# Patient Record
Sex: MALE | Race: BLACK OR AFRICAN AMERICAN | Employment: FULL TIME | URBAN - METROPOLITAN AREA
[De-identification: names, ages, dates, MRNs, and addresses within clinical notes are randomized per-mention and may not be internally consistent; named-entity substitution may affect disease eponyms.]

---

## 2019-02-04 ENCOUNTER — HOSPITAL ENCOUNTER (EMERGENCY)
Facility: CLINIC | Age: 44
Discharge: HOME | End: 2019-02-04
Attending: PHYSICIAN ASSISTANT | Admitting: PHYSICIAN ASSISTANT
Payer: COMMERCIAL

## 2019-02-04 VITALS
HEART RATE: 87 BPM | OXYGEN SATURATION: 98 % | TEMPERATURE: 97.3 F | DIASTOLIC BLOOD PRESSURE: 106 MMHG | RESPIRATION RATE: 16 BRPM | SYSTOLIC BLOOD PRESSURE: 146 MMHG

## 2019-02-04 DIAGNOSIS — M79.602 PAIN OF LEFT UPPER EXTREMITY: ICD-10-CM

## 2019-02-04 DIAGNOSIS — S09.90XA HEAD INJURY, INITIAL ENCOUNTER: ICD-10-CM
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
## 2019-02-04 PROCEDURE — 99282 EMERGENCY DEPT VISIT SF MDM: CPT

## 2019-02-04 RX ORDER — METHOCARBAMOL 500 MG/1
500 TABLET, FILM COATED ORAL 4 TIMES DAILY PRN
Qty: 20 TABLET | Refills: 0 | Status: SHIPPED | OUTPATIENT
Start: 2019-02-04 | End: 2019-06-04

## 2019-02-04 ASSESSMENT — ENCOUNTER SYMPTOMS
NAUSEA: 0
NUMBNESS: 0
VOMITING: 0
ABDOMINAL PAIN: 0
MYALGIAS: 1
BACK PAIN: 0

## 2019-02-04 NOTE — ED AVS SNAPSHOT
[facility] Emergency Department  [address]  [facility] [address]  Phone:  [phone]  Fax:  [phone]                                    German Cole   MRN: [id number]    Department:  [facility] Emergency Department   Date of Visit:  2/4/2019           After Visit Summary Signature Page    I have received my discharge instructions, and my questions have been answered. I have discussed any challenges I see with this plan with the nurse or doctor.    ..........................................................................................................................................  Patient/Patient Representative Signature      ..........................................................................................................................................  Patient Representative Print Name and Relationship to Patient    ..................................................               ................................................  Date                                   Time    ..........................................................................................................................................  Reviewed by Signature/Title    ...................................................              ..............................................  Date                                               Time          22EPIC Rev 08/18
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
## 2019-02-25 ENCOUNTER — OFFICE VISIT (OUTPATIENT)
Dept: FAMILY MEDICINE | Facility: CLINIC | Age: 44
End: 2019-02-25
Payer: COMMERCIAL

## 2019-02-25 VITALS
TEMPERATURE: 98.5 F | DIASTOLIC BLOOD PRESSURE: 90 MMHG | HEART RATE: 92 BPM | SYSTOLIC BLOOD PRESSURE: 138 MMHG | RESPIRATION RATE: 20 BRPM

## 2019-02-25 DIAGNOSIS — R03.0 SINGLE EPISODE OF ELEVATED BLOOD PRESSURE: ICD-10-CM

## 2019-02-25 DIAGNOSIS — S43.302A SUBLUXATION OF LEFT SHOULDER GIRDLE, INITIAL ENCOUNTER: Primary | ICD-10-CM

## 2019-02-25 PROCEDURE — 99203 OFFICE O/P NEW LOW 30 MIN: CPT | Performed by: FAMILY MEDICINE
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
## 2019-02-25 NOTE — PROGRESS NOTES
BP elevated.  BP Readings from Last 1 Encounters:   02/25/19 138/90       Recommend recheckig over the next month at [facility] by nurse or pharmacy  Mateo Barajas

## 2019-03-04 ENCOUNTER — ANCILLARY PROCEDURE (OUTPATIENT)
Dept: GENERAL RADIOLOGY | Facility: CLINIC | Age: 44
End: 2019-03-04
Attending: FAMILY MEDICINE
Payer: COMMERCIAL

## 2019-03-04 ENCOUNTER — OFFICE VISIT (OUTPATIENT)
Dept: ORTHOPEDICS | Facility: CLINIC | Age: 44
End: 2019-03-04
Payer: COMMERCIAL

## 2019-03-04 VITALS
HEIGHT: 72 IN | BODY MASS INDEX: 40.9 KG/M2 | SYSTOLIC BLOOD PRESSURE: 141 MMHG | DIASTOLIC BLOOD PRESSURE: 85 MMHG | WEIGHT: 302 LBS

## 2019-03-04 DIAGNOSIS — M75.42 CORACOID IMPINGEMENT OF LEFT SHOULDER: Primary | ICD-10-CM

## 2019-03-04 DIAGNOSIS — M25.512 ACUTE PAIN OF LEFT SHOULDER: ICD-10-CM

## 2019-03-04 DIAGNOSIS — M75.92 LEFT SUPRASPINATUS TENDINITIS: ICD-10-CM

## 2019-03-04 PROCEDURE — 73030 X-RAY EXAM OF SHOULDER: CPT | Mod: LT

## 2019-03-04 PROCEDURE — 99204 OFFICE O/P NEW MOD 45 MIN: CPT | Performed by: FAMILY MEDICINE

## 2019-03-04 RX ORDER — NAPROXEN 500 MG/1
500 TABLET ORAL 2 TIMES DAILY WITH MEALS
Qty: 60 TABLET | Refills: 1 | Status: SHIPPED | OUTPATIENT
Start: 2019-03-04 | End: 2019-04-24

## 2019-03-04 RX ORDER — CYCLOBENZAPRINE HCL 10 MG
10 TABLET ORAL
Qty: 30 TABLET | Refills: 0 | Status: SHIPPED | OUTPATIENT
Start: 2019-03-04 | End: 2019-04-08

## 2019-03-04 ASSESSMENT — MIFFLIN-ST. JEOR: SCORE: 2307.86
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
## 2019-03-12 ENCOUNTER — THERAPY VISIT (OUTPATIENT)
Dept: PHYSICAL THERAPY | Facility: CLINIC | Age: 44
End: 2019-03-12
Payer: COMMERCIAL

## 2019-03-12 DIAGNOSIS — M25.512 SHOULDER PAIN, LEFT: ICD-10-CM

## 2019-03-12 DIAGNOSIS — M75.42 CORACOID IMPINGEMENT OF LEFT SHOULDER: ICD-10-CM

## 2019-03-12 DIAGNOSIS — M75.92 LEFT SUPRASPINATUS TENDINITIS: ICD-10-CM

## 2019-03-12 PROCEDURE — 97010 HOT OR COLD PACKS THERAPY: CPT | Mod: GP | Performed by: PHYSICAL THERAPIST

## 2019-03-12 PROCEDURE — 97110 THERAPEUTIC EXERCISES: CPT | Mod: GP | Performed by: PHYSICAL THERAPIST

## 2019-03-12 PROCEDURE — 97161 PT EVAL LOW COMPLEX 20 MIN: CPT | Mod: GP | Performed by: PHYSICAL THERAPIST

## 2019-03-12 NOTE — PROGRESS NOTES
[facility] Initial Evaluation  Subjective:                                         Medical allergies: none.  Surgical history: 2016 low back.  Current medications:  Pain medication and muscle relaxants.  Current occupation is .    Primary job tasks include:  Driving.                                Objective:  System    Physical Exam    General     ROS    Assessment/Plan:
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
## 2019-03-13 PROBLEM — M25.512 SHOULDER PAIN, LEFT: Status: ACTIVE | Noted: 2019-03-13

## 2019-03-18 ENCOUNTER — THERAPY VISIT (OUTPATIENT)
Dept: PHYSICAL THERAPY | Facility: CLINIC | Age: 44
End: 2019-03-18
Payer: COMMERCIAL

## 2019-03-18 DIAGNOSIS — M25.512 SHOULDER PAIN, LEFT: ICD-10-CM

## 2019-03-18 PROCEDURE — 97110 THERAPEUTIC EXERCISES: CPT | Mod: GP

## 2019-04-08 DIAGNOSIS — M75.42 CORACOID IMPINGEMENT OF LEFT SHOULDER: ICD-10-CM

## 2019-04-08 DIAGNOSIS — M75.92 LEFT SUPRASPINATUS TENDINITIS: ICD-10-CM

## 2019-04-09 RX ORDER — CYCLOBENZAPRINE HCL 10 MG
TABLET ORAL
Qty: 30 TABLET | Refills: 0 | Status: SHIPPED | OUTPATIENT
Start: 2019-04-09 | End: 2019-06-04

## 2019-04-24 DIAGNOSIS — M75.92 LEFT SUPRASPINATUS TENDINITIS: ICD-10-CM

## 2019-04-24 DIAGNOSIS — M75.42 CORACOID IMPINGEMENT OF LEFT SHOULDER: ICD-10-CM

## 2019-04-26 RX ORDER — NAPROXEN 500 MG/1
TABLET ORAL
Qty: 60 TABLET | Refills: 0 | Status: SHIPPED | OUTPATIENT
Start: 2019-04-26 | End: 2019-06-04

## 2019-04-26 NOTE — PROGRESS NOTES
Subjective:  HPI                    Objective:  System    Physical Exam    General     ROS    Assessment/Plan:    DISCHARGE REPORT    Progress reporting period is from 3/12/2019 to 3/18/2019.       SUBJECTIVE  Subjective changes noted by patient: As of German's last visit he stated his shoulder was feeling less painful and stronger. He stated he has popping in his shoulder at at times and pain wtih driving if he has alot of resistance with his steering wheel.      Changes in function:  Yes (See Goal flowsheet attached for changes in current functional level)  Adverse reaction to treatment or activity: None    OBJECTIVE  Changes noted in objective findings:  Yes,   Objective: AROM flex- 170, abd- 170.      ASSESSMENT/PLAN  Updated problem list and treatment plan: Diagnosis 1:  Left shoulder pain  Pain -  hot/cold therapy  Decreased ROM/flexibility - manual therapy and therapeutic exercise  Decreased strength - therapeutic exercise and therapeutic activities  STG/LTGs have been met or progress has been made towards goals:  Yes (See Goal flow sheet completed today.)  Assessment of Progress: The patient's condition is improving.  Self Management Plans:  Patient has been instructed in a home treatment program.    German continues to require the following intervention to meet STG and LTG's:  PT intervention is no longer required to meet STG/LTG. German did not return for further PT sessions.      Recommendations:  This patient is ready to be discharged from therapy and continue their home treatment program.    Please refer to the daily flowsheet for treatment today, total treatment time and time spent performing 1:1 timed codes.

## 2019-05-02 PROBLEM — M25.512 SHOULDER PAIN, LEFT: Status: RESOLVED | Noted: 2019-03-13 | Resolved: 2019-05-02

## 2019-05-23 DIAGNOSIS — M75.92 LEFT SUPRASPINATUS TENDINITIS: ICD-10-CM

## 2019-05-23 DIAGNOSIS — M75.42 CORACOID IMPINGEMENT OF LEFT SHOULDER: ICD-10-CM

## 2019-05-24 NOTE — TELEPHONE ENCOUNTER
Medication Request for: Naproxen 500mg and Cyclobenzaprine 10mg          Patient currently taking:   Patient has ____ of medication remaining.  Patient is also taking OTC:     Problems/ Concerns of Patient: no side effects reported  Prescription for Naproxen 500mg last written on 4/26/19 by Dr. Yeo  Sig: take one tab twice daily   Last Fill Quantity: #60  refills: 0    Prescription for cyclobenzaprine 10mg  Last written on 4/9/19 by Dr. Yeo  Sig: take one tab nightly as needed    Last Office Visit by provider: 3/4/19    Patient desires to have faxed or E-prescribe to pharmacy if available  Pharmacy selected in [place].    Phone number patient can be reached at: Home number on file [phone] (home)    Can we leave a detailed message on this number? NO consent on file    Medication requests may take up to 3 business days for a response  Has patient been notified of this Unknown    Plan from last office visit was to return in 4 weeks.   Left voicemail asking patient to return call.     SOLE Rodas RN

## 2019-06-03 NOTE — TELEPHONE ENCOUNTER
Received [facility]nd [facility].     Left 2nd message for patient to return call.     Phone call to [facility] and leave message informing them that patient needs to be seen.     SOLE Rodas RN

## 2019-06-04 ENCOUNTER — HOSPITAL ENCOUNTER (OUTPATIENT)
Facility: CLINIC | Age: 44
Setting detail: OBSERVATION
Discharge: HOME | End: 2019-06-07
Attending: EMERGENCY MEDICINE | Admitting: HOSPITALIST
Payer: COMMERCIAL

## 2019-06-04 ENCOUNTER — TRANSFERRED RECORDS (OUTPATIENT)
Dept: HEALTH INFORMATION MANAGEMENT | Facility: CLINIC | Age: 44
End: 2019-06-04

## 2019-06-04 ENCOUNTER — APPOINTMENT (OUTPATIENT)
Dept: GENERAL RADIOLOGY | Facility: CLINIC | Age: 44
End: 2019-06-04
Attending: EMERGENCY MEDICINE
Payer: COMMERCIAL

## 2019-06-04 DIAGNOSIS — R07.9 CHEST PAIN ON EXERTION: ICD-10-CM

## 2019-06-04 PROBLEM — M79.89 PAIN AND SWELLING OF LOWER LEG: Status: ACTIVE | Noted: 2019-06-04

## 2019-06-04 PROBLEM — M79.669 PAIN AND SWELLING OF LOWER LEG: Status: ACTIVE | Noted: 2019-06-04

## 2019-06-04 LAB
ALBUMIN SERPL-MCNC: 4.4 G/DL (ref 3.4–5)
ALP SERPL-CCNC: 41 U/L (ref 40–150)
ALT SERPL W P-5'-P-CCNC: 48 U/L (ref 0–70)
ANION GAP SERPL CALCULATED.3IONS-SCNC: 7 MMOL/L (ref 3–14)
AST SERPL W P-5'-P-CCNC: 44 U/L (ref 0–45)
BASOPHILS # BLD AUTO: 0 10E9/L (ref 0–0.2)
BASOPHILS NFR BLD AUTO: 0.5 %
BILIRUB DIRECT SERPL-MCNC: 0.2 MG/DL (ref 0–0.2)
BILIRUB SERPL-MCNC: 1 MG/DL (ref 0.2–1.3)
BUN SERPL-MCNC: 11 MG/DL (ref 7–30)
CALCIUM SERPL-MCNC: 8.8 MG/DL (ref 8.5–10.1)
CHLORIDE SERPL-SCNC: 106 MMOL/L (ref 94–109)
CHOLEST SERPL-MCNC: 169 MG/DL
CO2 SERPL-SCNC: 25 MMOL/L (ref 20–32)
CREAT SERPL-MCNC: 0.94 MG/DL (ref 0.66–1.25)
DIFFERENTIAL METHOD BLD: NORMAL
EOSINOPHIL # BLD AUTO: 0.4 10E9/L (ref 0–0.7)
EOSINOPHIL NFR BLD AUTO: 4.5 %
ERYTHROCYTE [DISTWIDTH] IN BLOOD BY AUTOMATED COUNT: 13.2 % (ref 10–15)
GFR SERPL CREATININE-BSD FRML MDRD: >90 ML/MIN/{1.73_M2}
GLUCOSE SERPL-MCNC: 98 MG/DL (ref 70–99)
HBA1C MFR BLD: 6.7 % (ref 0–5.6)
HCT VFR BLD AUTO: 43.5 % (ref 40–53)
HDLC SERPL-MCNC: 35 MG/DL
HGB BLD-MCNC: 15 G/DL (ref 13.3–17.7)
IMM GRANULOCYTES # BLD: 0 10E9/L (ref 0–0.4)
IMM GRANULOCYTES NFR BLD: 0.1 %
LDLC SERPL CALC-MCNC: 114 MG/DL
LYMPHOCYTES # BLD AUTO: 4 10E9/L (ref 0.8–5.3)
LYMPHOCYTES NFR BLD AUTO: 51.5 %
MCH RBC QN AUTO: 31.8 PG (ref 26.5–33)
MCHC RBC AUTO-ENTMCNC: 34.5 G/DL (ref 31.5–36.5)
MCV RBC AUTO: 92 FL (ref 78–100)
MONOCYTES # BLD AUTO: 0.7 10E9/L (ref 0–1.3)
MONOCYTES NFR BLD AUTO: 8.8 %
NEUTROPHILS # BLD AUTO: 2.7 10E9/L (ref 1.6–8.3)
NEUTROPHILS NFR BLD AUTO: 34.6 %
NONHDLC SERPL-MCNC: 134 MG/DL
NRBC # BLD AUTO: 0 10*3/UL
NRBC BLD AUTO-RTO: 0 /100
NT-PROBNP SERPL-MCNC: 54 PG/ML (ref 0–450)
PLATELET # BLD AUTO: 272 10E9/L (ref 150–450)
POTASSIUM SERPL-SCNC: 3.8 MMOL/L (ref 3.4–5.3)
PROT SERPL-MCNC: 8.1 G/DL (ref 6.8–8.8)
RBC # BLD AUTO: 4.72 10E12/L (ref 4.4–5.9)
SODIUM SERPL-SCNC: 138 MMOL/L (ref 133–144)
TRIGL SERPL-MCNC: 100 MG/DL
TROPONIN I SERPL-MCNC: <0.015 UG/L (ref 0–0.04)
WBC # BLD AUTO: 7.7 10E9/L (ref 4–11)

## 2019-06-04 PROCEDURE — 84484 ASSAY OF TROPONIN QUANT: CPT | Performed by: EMERGENCY MEDICINE

## 2019-06-04 PROCEDURE — 99285 EMERGENCY DEPT VISIT HI MDM: CPT | Mod: 25

## 2019-06-04 PROCEDURE — 83036 HEMOGLOBIN GLYCOSYLATED A1C: CPT | Performed by: EMERGENCY MEDICINE

## 2019-06-04 PROCEDURE — 85025 COMPLETE CBC W/AUTO DIFF WBC: CPT | Performed by: EMERGENCY MEDICINE

## 2019-06-04 PROCEDURE — 93005 ELECTROCARDIOGRAM TRACING: CPT

## 2019-06-04 PROCEDURE — 25000128 H RX IP 250 OP 636: Performed by: EMERGENCY MEDICINE

## 2019-06-04 PROCEDURE — 80076 HEPATIC FUNCTION PANEL: CPT | Performed by: PHYSICIAN ASSISTANT

## 2019-06-04 PROCEDURE — 80061 LIPID PANEL: CPT | Performed by: PHYSICIAN ASSISTANT

## 2019-06-04 PROCEDURE — 71046 X-RAY EXAM CHEST 2 VIEWS: CPT

## 2019-06-04 PROCEDURE — G0378 HOSPITAL OBSERVATION PER HR: HCPCS

## 2019-06-04 PROCEDURE — 96374 THER/PROPH/DIAG INJ IV PUSH: CPT

## 2019-06-04 PROCEDURE — 83880 ASSAY OF NATRIURETIC PEPTIDE: CPT | Performed by: EMERGENCY MEDICINE

## 2019-06-04 PROCEDURE — 80048 BASIC METABOLIC PNL TOTAL CA: CPT | Performed by: EMERGENCY MEDICINE

## 2019-06-04 PROCEDURE — 99220 ZZC INITIAL OBSERVATION CARE,LEVL III: CPT | Performed by: PHYSICIAN ASSISTANT

## 2019-06-04 RX ORDER — ACETAMINOPHEN 500 MG
500-1000 TABLET ORAL 2 TIMES DAILY PRN
COMMUNITY

## 2019-06-04 RX ORDER — ACETAMINOPHEN 500 MG
500-1000 TABLET ORAL 2 TIMES DAILY PRN
Status: DISCONTINUED | OUTPATIENT
Start: 2019-06-04 | End: 2019-06-07 | Stop reason: HOSPADM

## 2019-06-04 RX ORDER — NAPROXEN 500 MG/1
500 TABLET ORAL 2 TIMES DAILY WITH MEALS
COMMUNITY

## 2019-06-04 RX ORDER — ONDANSETRON 4 MG/1
4 TABLET, ORALLY DISINTEGRATING ORAL EVERY 6 HOURS PRN
Status: DISCONTINUED | OUTPATIENT
Start: 2019-06-04 | End: 2019-06-07 | Stop reason: HOSPADM

## 2019-06-04 RX ORDER — FUROSEMIDE 10 MG/ML
20 INJECTION INTRAMUSCULAR; INTRAVENOUS ONCE
Status: COMPLETED | OUTPATIENT
Start: 2019-06-04 | End: 2019-06-04

## 2019-06-04 RX ORDER — FUROSEMIDE 10 MG/ML
20 INJECTION INTRAMUSCULAR; INTRAVENOUS ONCE
Status: COMPLETED | OUTPATIENT
Start: 2019-06-05 | End: 2019-06-05

## 2019-06-04 RX ORDER — AMOXICILLIN 250 MG
2 CAPSULE ORAL 2 TIMES DAILY PRN
Status: DISCONTINUED | OUTPATIENT
Start: 2019-06-04 | End: 2019-06-07 | Stop reason: HOSPADM

## 2019-06-04 RX ORDER — CYCLOBENZAPRINE HCL 10 MG
10 TABLET ORAL
Status: DISCONTINUED | OUTPATIENT
Start: 2019-06-04 | End: 2019-06-07 | Stop reason: HOSPADM

## 2019-06-04 RX ORDER — AMOXICILLIN 250 MG
1 CAPSULE ORAL 2 TIMES DAILY PRN
Status: DISCONTINUED | OUTPATIENT
Start: 2019-06-04 | End: 2019-06-07 | Stop reason: HOSPADM

## 2019-06-04 RX ORDER — NALOXONE HYDROCHLORIDE 0.4 MG/ML
.1-.4 INJECTION, SOLUTION INTRAMUSCULAR; INTRAVENOUS; SUBCUTANEOUS
Status: DISCONTINUED | OUTPATIENT
Start: 2019-06-04 | End: 2019-06-07 | Stop reason: HOSPADM

## 2019-06-04 RX ORDER — LIDOCAINE 40 MG/G
CREAM TOPICAL
Status: DISCONTINUED | OUTPATIENT
Start: 2019-06-04 | End: 2019-06-07 | Stop reason: HOSPADM

## 2019-06-04 RX ORDER — ONDANSETRON 2 MG/ML
4 INJECTION INTRAMUSCULAR; INTRAVENOUS EVERY 6 HOURS PRN
Status: DISCONTINUED | OUTPATIENT
Start: 2019-06-04 | End: 2019-06-07 | Stop reason: HOSPADM

## 2019-06-04 RX ORDER — CYCLOBENZAPRINE HCL 10 MG
10 TABLET ORAL
COMMUNITY

## 2019-06-04 RX ORDER — POLYETHYLENE GLYCOL 3350 17 G/17G
17 POWDER, FOR SOLUTION ORAL DAILY PRN
Status: DISCONTINUED | OUTPATIENT
Start: 2019-06-04 | End: 2019-06-07 | Stop reason: HOSPADM

## 2019-06-04 RX ADMIN — FUROSEMIDE 20 MG: 10 INJECTION, SOLUTION INTRAVENOUS at 18:58

## 2019-06-04 ASSESSMENT — ENCOUNTER SYMPTOMS
MYALGIAS: 1
UNEXPECTED WEIGHT CHANGE: 1
SHORTNESS OF BREATH: 1

## 2019-06-04 ASSESSMENT — MIFFLIN-ST. JEOR: SCORE: 2365.47

## 2019-06-04 NOTE — ED PROVIDER NOTES
History     Chief Complaint:  Shortness of Breath    HPI   German Cole is a 43 year old male smoker who presents with shortness of breath. The patient reported that his shortness of breath started approximately 3 days ago and is exacerbated by movement. He also stated that his bilateral leg pain started yesterday. Prior to arrival in the ED, the patient was seen in the clinic across the street and with concern for his symptoms and for more testing the clinic recommended presentation to the ED. The patent stated that he has gained approximately 30-40 pounds in the last 3 months, this is presumed to be water weight and the edema is mostly in his lower extremities. Additionally, the patient reported that he has experienced sharp chest pain approximately 4 times in the last year. The first time he believed it was heart burn but noticed that the pain did not go away after taking an antacid. The chest pain is exacerbated by stressful situations.     Allergies:  The patient has no known drug allergies.    Medications:    Tylenol  Flexeril   Naprosyn    Past Medical History:    Subluxation of left shoulder girdle  Single episode of elevated blood pressure    Past Surgical History:    AS Diskectomy percutaneous lumbar    Family History:    Back pain      Social History:  Current smoker   Rare alcohol use   Negative for drug use.    Marital Status:   [2]       Review of Systems   Constitutional: Positive for unexpected weight change.   Respiratory: Positive for shortness of breath.    Cardiovascular: Positive for chest pain and leg swelling.   Musculoskeletal: Positive for myalgias.   All other systems reviewed and are negative.        Physical Exam     Patient Vitals for the past 24 hrs:   BP Temp Temp src Pulse Heart Rate Resp SpO2   06/04/19 1900 (!) 150/93 -- -- 96 -- -- 96 %   06/04/19 1826 -- -- -- -- -- -- 97 %   06/04/19 1825 156/89 -- -- 91 -- -- --   06/04/19 1723 -- -- -- -- 96 -- 99 %   06/04/19  1715 -- -- -- -- 99 -- 99 %   06/04/19 1700 -- -- -- -- 92 -- 98 %   06/04/19 1645 (!) 146/105 -- -- -- 93 -- --   06/04/19 1611 (!) 154/106 98.9  F (37.2  C) Oral 105 -- 20 95 %         Physical Exam  Nursing note and vitals reviewed.  Constitutional: Cooperative.   HENT:   Mouth/Throat: Moist mucous membranes.   Eyes: EOMI, nonicteric sclera  Cardiovascular: Normal rate, regular rhythm, no murmurs, rubs, or gallops  Pulmonary/Chest: Effort normal and breath sounds normal. No respiratory distress. No wheezes. No rales.   Abdominal: Soft. Nontender, nondistended, no guarding or rigidity. BS present.   Musculoskeletal: Normal range of motion. 3+ pitting edema BLE  Neurological: Alert. Moves all extremities spontaneously.   Skin: Skin is warm and dry. No rash noted.   Psychiatric: Normal mood and affect.       Emergency Department Course   ECG:  Indication: Shortness of breath  Time: 1622  Vent. Rate 98 bpm. NY interval 150. QRS duration 88. QT/QTc 362/462. P-R-T axis 35 47 11.  Sinus rhythm, nonspecific T wave abnormality, Prolonged QT, Abnormal ECG, No previous ECGs available. Read time: 1645    Imaging:  Radiographic findings were communicated with the patient who voiced understanding of the findings.    XR Chest PA & LAT:   Cardiac silhouette and pulmonary vasculature are within  normal limits. No focal airspace disease, pleural effusion or  Pneumothorax, as per radiology.   Imaging independently reviewed and agree with radiologist interpretation.     Laboratory:  CBC: WBC: 7.7, HGB: 15.0, PLT: 272  BMP:all WNL (Creatinine: 0.94)  BNP: 54  1647 Troponin: <0.015    Interventions:  1858 Furosemide 20mg IV    Please see MAR for full list of medications administered in the ED.    Emergency Department Course:  Nursing notes and vitals reviewed. (1651) I performed an exam of the patient as documented above.     IV inserted. Medicine administered as documented above. Blood drawn. This was sent to the lab for further  testing, results above.    The patient was sent for a chest XR while in the emergency department, findings above.     (1837) I rechecked the patient and discussed the results of his workup thus far.     (1947)  I consulted with Dr. Woodson of the hospitalist services. They are in agreement to accept the patient for admission.    Findings and plan explained to the Patient who consents to admission. Discussed the patient with Caren Hampton, who will admit the patient to a obs bed for further monitoring, evaluation, and treatment.       Impression & Plan    Medical Decision Making:  Pt presents with CC chest pain, dyspnea, both worse with exertion, as well as BLE edema with significant weight gain. Pt doesn't have a primary care provider. Ddx includes CHF, dependent edema, liver/kidney abnormalities, among other abnormalities. ED workup negative at this time. Given pt's poor follow-up in addition to his symptoms of pain and dyspnea, I do believe admission is most prudent. Discussed with pt and wife who are in agreement with plan. He's admitted in stable condition to the hospitalist service. All questions answered.     Diagnosis:    ICD-10-CM    1. Chest pain on exertion R07.9 Lipid panel reflex to direct LDL     Hemoglobin A1c     Hemoglobin A1c     CANCELED: Hemoglobin A1c       Disposition:  Admitted to Dr. Woodson    Scribe Disclosure:  I, Mera Painter, am serving as a scribe on 6/4/2019 at 4:51 PM to personally document services performed by Kvng Guerra MD based on my observations and the provider's statements to me.         Mera Painter  6/4/2019   [facility] EMERGENCY DEPARTMENT       Kvng Guerra MD  06/05/19 0557

## 2019-06-04 NOTE — ED TRIAGE NOTES
Patient was seen at  and sent for further evaluation for cardiac symptoms. SOB with exertion. Weight gain of 30lbs. Swelling in bilateral legs and pain with ambulation.

## 2019-06-04 NOTE — ED NOTES
".  [facility]  ED Nurse Handoff Report    German Cole is a 43 year old male   ED Chief complaint: Shortness of Breath  . ED Diagnosis:   Final diagnoses:   Chest pain on exertion     Allergies: No Known Allergies    Code Status: Full Code  Activity level - Baseline/Home:  Independent. Activity Level - Current:   Independent. Lift room needed: No. Bariatric: No   Needed: No   Isolation: No. Infection: Not Applicable.     Vital Signs:   Vitals:    06/04/19 1715 06/04/19 1723 06/04/19 1825 06/04/19 1826   BP:   156/89    Pulse:   91    Resp:       Temp:       TempSrc:       SpO2: 99% 99%  97%       Cardiac Rhythm:  ,      Pain level:    Patient confused: No. Patient Falls Risk: Yes.   Elimination Status: Has voided   Patient Report - Initial Complaint: Shortness of Breath. Focused Assessment:   Pt sent over from clinic. Shortness of breath for about 3 days, worse with exertion or movement, denies chest pain today. Bilateral leg pain starting yesterday. 30-40 pound weight gain in the last 3 months. Per MD note: \"this is presumed to be water weight and the edema is mostly in his lower extremities. Additionally, the patient reported that he has experienced sharp chest pain approximately 4 times in the last year. The first time he believed it was heart burn but noticed that the pain did not go away after taking an antacid. The chest pain is exacerbated by stressful situations.\"       Tests Performed: EKG, labs, CXR. Abnormal Results: .  Labs Ordered and Resulted from Time of ED Arrival Up to the Time of Departure from the ED   CBC WITH PLATELETS DIFFERENTIAL   BASIC METABOLIC PANEL   NT PROBNP INPATIENT   TROPONIN I   PERIPHERAL IV CATHETER   .  Chest XR,  PA & LAT   Final Result   IMPRESSION: Cardiac silhouette and pulmonary vasculature are within   normal limits. No focal airspace disease, pleural effusion or   pneumothorax.      DAVID KRUSE MD      .   Treatments provided: " "Lasix  Family Comments: At bedside  OBS brochure/video discussed/provided to patient:  Yes  ED Medications:   Medications   furosemide (LASIX) injection 20 mg (has no administration in time range)     Drips infusing:  No  For the majority of the shift, the patient's behavior Green. Interventions performed were n/a.     Severe Sepsis OR Septic Shock Diagnosis Present: No    RECEIVING UNIT ED HANDOFF REVIEW    Above ED Nurse Handoff Report was reviewed: Yes  Reviewed by: Joleen MCDOWELL Che on June 4, 2019 at 8:37 PM     ED Nurse Name/Phone Number: Rashida \"Stacy\" DAREN Noyola  6:49 PM    "

## 2019-06-05 ENCOUNTER — APPOINTMENT (OUTPATIENT)
Dept: CARDIOLOGY | Facility: CLINIC | Age: 44
End: 2019-06-05
Attending: PHYSICIAN ASSISTANT
Payer: COMMERCIAL

## 2019-06-05 LAB
ALBUMIN UR-MCNC: NEGATIVE MG/DL
ANION GAP SERPL CALCULATED.3IONS-SCNC: 4 MMOL/L (ref 3–14)
APPEARANCE UR: CLEAR
BILIRUB UR QL STRIP: NEGATIVE
BUN SERPL-MCNC: 12 MG/DL (ref 7–30)
CALCIUM SERPL-MCNC: 8.3 MG/DL (ref 8.5–10.1)
CHLORIDE SERPL-SCNC: 106 MMOL/L (ref 94–109)
CO2 SERPL-SCNC: 27 MMOL/L (ref 20–32)
COLOR UR AUTO: YELLOW
CREAT SERPL-MCNC: 0.86 MG/DL (ref 0.66–1.25)
ERYTHROCYTE [DISTWIDTH] IN BLOOD BY AUTOMATED COUNT: 13.2 % (ref 10–15)
GFR SERPL CREATININE-BSD FRML MDRD: >90 ML/MIN/{1.73_M2}
GLUCOSE BLDC GLUCOMTR-MCNC: 122 MG/DL (ref 70–99)
GLUCOSE SERPL-MCNC: 136 MG/DL (ref 70–99)
GLUCOSE UR STRIP-MCNC: NEGATIVE MG/DL
HCT VFR BLD AUTO: 41.8 % (ref 40–53)
HGB BLD-MCNC: 14.3 G/DL (ref 13.3–17.7)
HGB UR QL STRIP: NEGATIVE
INTERPRETATION ECG - MUSE: NORMAL
KETONES UR STRIP-MCNC: NEGATIVE MG/DL
LEUKOCYTE ESTERASE UR QL STRIP: NEGATIVE
MCH RBC QN AUTO: 31.6 PG (ref 26.5–33)
MCHC RBC AUTO-ENTMCNC: 34.2 G/DL (ref 31.5–36.5)
MCV RBC AUTO: 92 FL (ref 78–100)
MUCOUS THREADS #/AREA URNS LPF: PRESENT /LPF
NITRATE UR QL: NEGATIVE
PH UR STRIP: 5.5 PH (ref 5–7)
PLATELET # BLD AUTO: 246 10E9/L (ref 150–450)
POTASSIUM SERPL-SCNC: 3.8 MMOL/L (ref 3.4–5.3)
RBC # BLD AUTO: 4.53 10E12/L (ref 4.4–5.9)
RBC #/AREA URNS AUTO: 1 /HPF (ref 0–2)
SODIUM SERPL-SCNC: 137 MMOL/L (ref 133–144)
SOURCE: ABNORMAL
SP GR UR STRIP: 1.03 (ref 1–1.03)
UROBILINOGEN UR STRIP-MCNC: NORMAL MG/DL (ref 0–2)
WBC # BLD AUTO: 5.2 10E9/L (ref 4–11)
WBC #/AREA URNS AUTO: <1 /HPF (ref 0–5)

## 2019-06-05 PROCEDURE — 93306 TTE W/DOPPLER COMPLETE: CPT | Mod: 26 | Performed by: INTERNAL MEDICINE

## 2019-06-05 PROCEDURE — 25500064 ZZH RX 255 OP 636: Performed by: HOSPITALIST

## 2019-06-05 PROCEDURE — 99225 ZZC SUBSEQUENT OBSERVATION CARE,LEVEL II: CPT | Performed by: PHYSICIAN ASSISTANT

## 2019-06-05 PROCEDURE — 40000264 ECHOCARDIOGRAM COMPLETE

## 2019-06-05 PROCEDURE — 96376 TX/PRO/DX INJ SAME DRUG ADON: CPT | Mod: 59

## 2019-06-05 PROCEDURE — 25000128 H RX IP 250 OP 636: Performed by: PHYSICIAN ASSISTANT

## 2019-06-05 PROCEDURE — 99214 OFFICE O/P EST MOD 30 MIN: CPT | Mod: 25 | Performed by: INTERNAL MEDICINE

## 2019-06-05 PROCEDURE — 36415 COLL VENOUS BLD VENIPUNCTURE: CPT | Performed by: PHYSICIAN ASSISTANT

## 2019-06-05 PROCEDURE — 25000132 ZZH RX MED GY IP 250 OP 250 PS 637: Performed by: INTERNAL MEDICINE

## 2019-06-05 PROCEDURE — 85027 COMPLETE CBC AUTOMATED: CPT | Performed by: PHYSICIAN ASSISTANT

## 2019-06-05 PROCEDURE — 81001 URINALYSIS AUTO W/SCOPE: CPT | Performed by: PHYSICIAN ASSISTANT

## 2019-06-05 PROCEDURE — G0378 HOSPITAL OBSERVATION PER HR: HCPCS

## 2019-06-05 PROCEDURE — 80048 BASIC METABOLIC PNL TOTAL CA: CPT | Performed by: PHYSICIAN ASSISTANT

## 2019-06-05 PROCEDURE — 00000146 ZZHCL STATISTIC GLUCOSE BY METER IP

## 2019-06-05 PROCEDURE — 25000132 ZZH RX MED GY IP 250 OP 250 PS 637: Performed by: PHYSICIAN ASSISTANT

## 2019-06-05 RX ORDER — IRBESARTAN 150 MG/1
150 TABLET ORAL AT BEDTIME
Status: DISCONTINUED | OUTPATIENT
Start: 2019-06-05 | End: 2019-06-07 | Stop reason: HOSPADM

## 2019-06-05 RX ORDER — NICOTINE POLACRILEX 4 MG
15-30 LOZENGE BUCCAL
Status: DISCONTINUED | OUTPATIENT
Start: 2019-06-05 | End: 2019-06-07 | Stop reason: HOSPADM

## 2019-06-05 RX ORDER — ROSUVASTATIN CALCIUM 5 MG/1
10 TABLET, COATED ORAL DAILY
Status: DISCONTINUED | OUTPATIENT
Start: 2019-06-05 | End: 2019-06-07 | Stop reason: HOSPADM

## 2019-06-05 RX ORDER — DEXTROSE MONOHYDRATE 25 G/50ML
25-50 INJECTION, SOLUTION INTRAVENOUS
Status: DISCONTINUED | OUTPATIENT
Start: 2019-06-05 | End: 2019-06-07 | Stop reason: HOSPADM

## 2019-06-05 RX ORDER — FUROSEMIDE 10 MG/ML
20 INJECTION INTRAMUSCULAR; INTRAVENOUS ONCE
Status: COMPLETED | OUTPATIENT
Start: 2019-06-05 | End: 2019-06-05

## 2019-06-05 RX ORDER — ATORVASTATIN CALCIUM 40 MG/1
40 TABLET, FILM COATED ORAL EVERY EVENING
Status: DISCONTINUED | OUTPATIENT
Start: 2019-06-05 | End: 2019-06-05

## 2019-06-05 RX ADMIN — ACETAMINOPHEN 1000 MG: 500 TABLET, FILM COATED ORAL at 22:25

## 2019-06-05 RX ADMIN — HUMAN ALBUMIN MICROSPHERES AND PERFLUTREN 3 ML: 10; .22 INJECTION, SOLUTION INTRAVENOUS at 09:35

## 2019-06-05 RX ADMIN — ROSUVASTATIN CALCIUM 10 MG: 5 TABLET, FILM COATED ORAL at 18:11

## 2019-06-05 RX ADMIN — IRBESARTAN 150 MG: 150 TABLET, FILM COATED ORAL at 22:25

## 2019-06-05 RX ADMIN — FUROSEMIDE 20 MG: 10 INJECTION, SOLUTION INTRAVENOUS at 08:43

## 2019-06-05 RX ADMIN — FUROSEMIDE 20 MG: 10 INJECTION, SOLUTION INTRAVENOUS at 06:14

## 2019-06-05 ASSESSMENT — MIFFLIN-ST. JEOR: SCORE: 2370.01
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
## 2019-06-05 NOTE — PLAN OF CARE
"PRIMARY DIAGNOSIS: \"Bilateral LE swelling  OUTPATIENT/OBSERVATION GOALS TO BE MET BEFORE DISCHARGE:  1. ADLs back to baseline: Yes    2. Activity and level of assistance: Ambulating independently.    3. Pain status: Pain free.    4. Return to near baseline physical activity: Yes     Discharge Planner Nurse   Safe discharge environment identified: Yes  Barriers to discharge: Yes, BLE swelling       Entered by: Joleen MCDOWELL Che 06/05/2019 4:38 AM   Vital signs:  Temp: 97  F (36.1  C) Temp src: Oral BP: 145/86 Pulse: 84 Heart Rate: 96 Resp: 18 SpO2: 92 %     Patient alert and oriented x4. VSS. Up independently in room.  Denies pain pain on admission.   +3 edema to BLE,  Also noted left knee swelling.  Skin intact. Plan  monitor  I&O, Daily weights, EKG in the morning IV lasix  Please review provider order for any additional goals.   Nurse to notify provider when observation goals have been met and patient is ready for discharge.  "

## 2019-06-05 NOTE — CONSULTS
Discharge Planner   Discharge Plans in progress: Yes. Met with patient who has chosen [facility] clinic to establish care.   Barriers to discharge plan: Patient is currently not established with a clinic or PCP.   Follow up plan: Scheduled appointment for post hospitalization and to establish care at [facility] with Dr. Lazo on June 19th at 10:40 AM. Updated to AVS.        Entered by: Kendra Hernandez 06/05/2019 10:10 AM       Kendra GALDAMEZ  Care Transition Services  [phone]

## 2019-06-05 NOTE — PLAN OF CARE
"PRIMARY DIAGNOSIS: \"Bilateral LE swelling  OUTPATIENT/OBSERVATION GOALS TO BE MET BEFORE DISCHARGE:  ADLs back to baseline: Yes    Activity and level of assistance: Ambulating independently.    Pain status: Pain free.    Return to near baseline physical activity: Yes     Discharge Planner Nurse   Safe discharge environment identified: Yes  Barriers to discharge: Yes, BLE swelling       Entered by: Joleen MCDOWELL Che 06/04/2019 10:20 PM   Patient aler and oriented x4. VSS. Up independently in room.  Denies pain pain on admission.   +3 edema to BLE,  Also noted left knee swelling.  Skin intact. Plan  monitor  I&O, Daily weights, EKG in the morning IV lasix  Please review provider order for any additional goals.   Nurse to notify provider when observation goals have been met and patient is ready for discharge.  "

## 2019-06-05 NOTE — CONSULTS
Consult Date:  06/05/2019      CARDIOLOGY CONSULTATION      PRIMARY CARE PHYSICIAN:  [facility].        REASON FOR CONSULTATION:  I have been asked to see German Cole by Riya Sanchez PA-C, for evaluation of peripheral edema and regional wall motion abnormalities of the left ventricle on echocardiogram.      HISTORY OF PRESENT ILLNESS:  German Cole is a pleasant 43-year-old -American male who notes 3-4 days of left knee pain and increasing lower extremity edema.  He denies chest pain or tightness.  He has never had a known myocardial infarction or stroke.  He has some dyspnea on exertion and at the urging of his family came to the emergency room for evaluation of the lower extremity edema.  Troponin on admission was normal.  EKG demonstrated a normal sinus rhythm with T-wave inversion in III, aVF and biphasic ST waves in V4 through V6.  An echocardiogram performed yesterday showed ejection fraction of 55%-60% with mid anterolateral wall hypokinesis.  Normal right ventricular size and function.  No significant valvular abnormalities.  RVSP could not be approximated due to inadequate tricuspid regurgitation.  There was mild trileaflet aortic sclerosis without stenosis.  There was mild concentric left ventricular hypertrophy.  Diastolic evaluation was normal.  There was a mild intracavitary gradient with a peak of 20 mmHg with Valsalva.  The patient denies trauma to his lower extremities.  He denies PND or orthopnea.  He denies palpitations, syncope or presyncope.  His risk factors include ongoing tobacco abuse of approximately 1 pack of cigarettes per week.  Up until the last few years, the patient was smoking 1/2 pack of cigarettes per day since the age of 23.  He notes a 30-40 pound weight gain over the last year.  He denies family history of premature atherosclerosis.  He has never been diagnosed with diabetes or hypertension, but he has not seen a physician in several years.       ALLERGIES:  None known about.      MEDICATIONS ON ADMISSION:   1.  Tylenol 500 mg 1-2 tablets by mouth 2 times daily.   2.  Cyclobenzaprine 10 mg by mouth nightly as needed for muscle spasms.     3.  Naproxen 500 mg by mouth 2 times daily with meals.      PAST MEDICAL HISTORY:   1.  Previous diskectomy and back surgery.   2.  Previous inguinal herniorrhaphy.      No history of peptic ulcer disease, cancer, tuberculosis, kidney stones or disease, hyper or hypothyroidism.      SOCIAL HISTORY:  The patient is a .  He has 7 children ranging from 6 months to 22 years old.  He moved here 5 years ago from [place].  He is .  Tobacco is as above.  He does drink alcohol but not to excess.      FAMILY HISTORY:  Only as mentioned above.      REVIEW OF SYSTEMS:  A 12-point review of system is performed with pertinent positives and negatives listed in the HPI.  All other review of systems are asked and are negative.      PHYSICAL EXAMINATION:   VITAL SIGNS:  Current blood pressure is 157/84, pulse 85 and regular, respiratory rate 16.  The patient is afebrile.  Current weight is 316 pounds (143 kg).  The patient states his dry weight is generally around 225 pounds.   GENERAL:  The patient is an alert -American male in no acute distress.   HEENT:  Normocephalic, atraumatic without xanthelasma.  No arcus senilis.  No oropharyngeal lesions.  Mucous membranes are moist.   NECK:  Supple without thyromegaly.  Carotid upstroke is brisk without bruits.   CHEST:  Clear to auscultation without wheezes, rales or rhonchi.  No kyphosis or scoliosis.   CARDIAC:  Regular rate and rhythm, normal S1 and S2 without audible gallop or murmur.  No heave, rub or thrill.  No JVD or HJR.  Pulses were all intact without bruits.  No tenderness to palpation across the precordium.   ABDOMEN:  Mildly obese, soft, nontender without organomegaly.  Bowel sounds are present.  No bruits.   EXTREMITIES:  Without cyanosis or clubbing.  1+  bilateral pretibial edema.   SKIN:  Warm, dry and intact.   NEUROLOGIC:  Alert and oriented x3.  Cranial nerves II through XII grossly intact.   PSYCHIATRIC:  Affect is normal.  The patient is very soft spoken.      DIAGNOSTIC DATA AND IMAGING:  EKG is as mentioned above.      Echocardiogram as mentioned above.      Chest x-ray is normal.  I personally reviewed the patient's chest x-ray.  No pulmonary edema or cardiomegaly.  No obvious coronary calcification.      LABORATORY DATA:  Hemoglobin A1c 6.7%.  Glucose 98 and subsequently 136.  Electrolytes on admission, sodium 138, potassium 3.8, chloride 106, carbon dioxide 25, BUN 11, creatinine 0.94.  Albumin 4.4, total protein 8.1.  Total cholesterol 169, HDL 35, , triglycerides 100.  N-terminal proBNP 54.  White count 7700, hemoglobin 15, platelet count 272,000.      ASSESSMENT:   1.  German Cole is a pleasant 43-year-old male who presents for bilateral lower extremity edema.  It is not clear whether this is due to venous insufficiency.  He has 1+ bilateral peripheral edema.  This is improved with IV Lasix.  There is nothing to suggest high right heart pressures or pulmonary hypertension.  I suspect that this is related to his salt intake and his sedentary job.  In the meantime, his echocardiogram was read showing an anterolateral wall motion abnormality.  I think it is prudent to rule out coronary artery disease given his risk.  A nuclear stress test is suggested.   2.  The patient likely has type 2 diabetes mellitus.  Based on his hemoglobin A1c.  I would suggest diet and exercise to see if this can be reduced without medications.   3.  The patient's American Heart Association/ACC risk calculator showed a risk of 15.3% 10-year risk of heart disease or stroke.  I would suggest this patient be on a statin to lower his LDL below 70.   4.  Tobacco abuse.  I have encouraged smoking cessation.  I have suggested Chantix or nicotine patches or even cold turkey.   I have tried to motivate the patient to do so.  I did spend 7-10 minutes on smoking cessation.   5.  Moderate obesity.  I have suggested that this patient begin an exercise program and see a dietitian.   6.  Hypertension.  This may also be related to his weight and salt intake.  In addition, the patient notes that he snores and has apneic periods when he sleeps.  He has not been tested for sleep apnea.  He should be tested as an outpatient.      SUGGESTIONS:   1.  Nuclear stress test tomorrow.   2.  The patient should see a dietitian with appropriate changes in his diet for low-salt, diabetic diet.   3.  The patient should start an exercise program.   4.  Management of hypertension up to the hospitalist.  Certainly, the addition of the ACE inhibitor or ARB agent is suggested with diabetes.   5.  Outpatient testing for obstructive sleep apnea.   6.  Treatment of lipids to an LDL goal of less than 70 with a statin.   7.  Smoking cessation.        It is my pleasure to assist in the care of Ilan Cole.  I will see him again tomorrow.  All his questions were answered to his satisfaction.         RADHA FREIRE MD, [facility]             D: 2019   T: 2019   MT: WT      Name:     ILAN COLE   MRN:      [id number]        Account:       [id number]   :      1975           Consult Date:  2019      Document: [id number]       cc: Primary Care Physician

## 2019-06-05 NOTE — CONSULTS
Full Cardiology consultation dictated.  Nuclear stress test planned for tomorrow.  Risk factor modification discussed.  Daniel Velarde MD, FACC  June 5, 2019 3:56 PM  [id number]

## 2019-06-05 NOTE — PLAN OF CARE
Patient Improving      PRIMARY DIAGNOSIS: CHEST PAIN  OUTPATIENT/OBSERVATION GOALS TO BE MET BEFORE DISCHARGE:  1. Ruled out acute coronary syndrome (negative or stable Troponin):  Yes  2. Pain Status: Improved with use of alternative comfort measures i.e.: positioning  3. Appropriate provocative testing performed: Yes  - Stress Test Procedure: Echo  - Interpretation of cardiac rhythm per telemetry tech: - no tele ordered    4. Cleared by Consultants (if applicable):N/A  5. Return to near baseline physical activity: Yes  Discharge Planner Nurse   Safe discharge environment identified: Yes  Barriers to discharge: No       Entered by: Tereza Hurtado 06/05/2019 7:47 AM     Please review provider order for any additional goals.   Nurse to notify provider when observation goals have been met and patient is ready for discharge.    Up ad sarah, A&Ox4, BLE +2 edema, denies cp but does report left leg pain, LS clear, room air, ABRAMS, BS A&Ax4, passing gas, regular diet, plan for echo this AM, will continue to monitor and provide supportive cares.

## 2019-06-05 NOTE — PLAN OF CARE
Patient No change     PRIMARY DIAGNOSIS: CHEST PAIN  OUTPATIENT/OBSERVATION GOALS TO BE MET BEFORE DISCHARGE:  1. Ruled out acute coronary syndrome (negative or stable Troponin):  Yes  2. Pain Status: Improved with use of alternative comfort measures i.e.: positioning  3. Appropriate provocative testing performed: Yes  - Stress Test Procedure: Echo and lexiscan tomorrow  - Interpretation of cardiac rhythm per telemetry tech: awaiting tele box     4. Cleared by Consultants (if applicable):No - cards following  5. Return to near baseline physical activity: Yes    Discharge Planner Nurse   Safe discharge environment identified: Yes  Barriers to discharge: Yes - cards to see       Entered by: Tereza Hurtado 06/05/2019 4:00PM    Please review provider order for any additional goals.   Nurse to notify provider when observation goals have been met and patient is ready for discharge.    Lexiscan tomorrow, denies pain unless moving, LS clear, room air, ABRAMS with light activity, up ad sarah, will continue to monitor and provide supportive cares.

## 2019-06-05 NOTE — PLAN OF CARE
Patient No change    PRIMARY DIAGNOSIS: CHEST PAIN  OUTPATIENT/OBSERVATION GOALS TO BE MET BEFORE DISCHARGE:  1. Ruled out acute coronary syndrome (negative or stable Troponin):  Yes  2. Pain Status: Improved with use of alternative comfort measures i.e.: positioning  3. Appropriate provocative testing performed: Yes  - Stress Test Procedure: Echo  - Interpretation of cardiac rhythm per telemetry tech: no tele ordered    4. Cleared by Consultants (if applicable):No - cards to see  5. Return to near baseline physical activity: Yes  Discharge Planner Nurse   Safe discharge environment identified: Yes  Barriers to discharge: Yes - cards to see       Entered by: Tereza Hurtado 06/05/2019 11:45 AM     Please review provider order for any additional goals.   Nurse to notify provider when observation goals have been met and patient is ready for discharge.  Cards consult added. No changes since this AM.

## 2019-06-05 NOTE — PLAN OF CARE
ROOM # 208-2    Living Situation (if not independent, order SW consult):  Lives at home with wife and kids  Facility name:  : : Florencia (spouse)    Activity level at baseline: Independent  Activity level on admit: independent      Patient registered to observation; given Patient Bill of Rights; given the opportunity to ask questions about observation status and their plan of care.  Patient has been oriented to the observation room, bathroom and call light is in place.    Discussed discharge goals and expectations with patient/family.

## 2019-06-05 NOTE — PHARMACY-ADMISSION MEDICATION HISTORY
Admission medication history interview status for this patient is complete. See [facility] admission navigator for allergy information, prior to admission medications and immunization status.     Medication history interview source(s):Patient  Medication history resources (including written lists, pill bottles, clinic record):None  [facility]     Changes made to PTA medication list:  Added: all   Deleted: none  Changed: none    Actions taken by pharmacist (provider contacted, etc):None     Additional medication history information:None    Medication reconciliation/reorder completed by provider prior to medication history? No    Do you take OTC medications (eg tylenol, ibuprofen, fish oil, eye/ear drops, etc)? Y (Y/N)    For patients on insulin therapy: N (Y/N)      Prior to Admission medications    Medication Sig Last Dose Taking? Auth Provider   acetaminophen (TYLENOL) 500 MG tablet Take 500-1,000 mg by mouth 2 times daily as needed for mild pain Past Week at Unknown time Yes Unknown, Entered By History   cyclobenzaprine (FLEXERIL) 10 MG tablet Take 10 mg by mouth nightly as needed for muscle spasms Past Month at ran out 2 weeks ago Yes Unknown, Entered By History   naproxen (NAPROSYN) 500 MG tablet Take 500 mg by mouth 2 times daily (with meals) 6/4/2019 at am Yes Unknown, Entered By History

## 2019-06-06 ENCOUNTER — APPOINTMENT (OUTPATIENT)
Dept: CARDIOLOGY | Facility: CLINIC | Age: 44
End: 2019-06-06
Attending: INTERNAL MEDICINE
Payer: COMMERCIAL

## 2019-06-06 ENCOUNTER — APPOINTMENT (OUTPATIENT)
Dept: NUCLEAR MEDICINE | Facility: CLINIC | Age: 44
End: 2019-06-06
Attending: INTERNAL MEDICINE
Payer: COMMERCIAL

## 2019-06-06 LAB
ANION GAP SERPL CALCULATED.3IONS-SCNC: 6 MMOL/L (ref 3–14)
BUN SERPL-MCNC: 12 MG/DL (ref 7–30)
CALCIUM SERPL-MCNC: 8.4 MG/DL (ref 8.5–10.1)
CHLORIDE SERPL-SCNC: 103 MMOL/L (ref 94–109)
CO2 SERPL-SCNC: 26 MMOL/L (ref 20–32)
CREAT SERPL-MCNC: 0.93 MG/DL (ref 0.66–1.25)
GFR SERPL CREATININE-BSD FRML MDRD: >90 ML/MIN/{1.73_M2}
GLUCOSE BLDC GLUCOMTR-MCNC: 141 MG/DL (ref 70–99)
GLUCOSE BLDC GLUCOMTR-MCNC: 88 MG/DL (ref 70–99)
GLUCOSE SERPL-MCNC: 196 MG/DL (ref 70–99)
POTASSIUM SERPL-SCNC: 3.9 MMOL/L (ref 3.4–5.3)
SODIUM SERPL-SCNC: 135 MMOL/L (ref 133–144)

## 2019-06-06 PROCEDURE — 25000132 ZZH RX MED GY IP 250 OP 250 PS 637: Performed by: INTERNAL MEDICINE

## 2019-06-06 PROCEDURE — G0378 HOSPITAL OBSERVATION PER HR: HCPCS

## 2019-06-06 PROCEDURE — 25000128 H RX IP 250 OP 636: Performed by: PHYSICIAN ASSISTANT

## 2019-06-06 PROCEDURE — 34300033 ZZH RX 343: Performed by: HOSPITALIST

## 2019-06-06 PROCEDURE — 36415 COLL VENOUS BLD VENIPUNCTURE: CPT | Performed by: PHYSICIAN ASSISTANT

## 2019-06-06 PROCEDURE — 93017 CV STRESS TEST TRACING ONLY: CPT

## 2019-06-06 PROCEDURE — 80048 BASIC METABOLIC PNL TOTAL CA: CPT | Performed by: PHYSICIAN ASSISTANT

## 2019-06-06 PROCEDURE — 78452 HT MUSCLE IMAGE SPECT MULT: CPT

## 2019-06-06 PROCEDURE — A9502 TC99M TETROFOSMIN: HCPCS | Performed by: HOSPITALIST

## 2019-06-06 PROCEDURE — 25000132 ZZH RX MED GY IP 250 OP 250 PS 637: Performed by: PHYSICIAN ASSISTANT

## 2019-06-06 PROCEDURE — 96376 TX/PRO/DX INJ SAME DRUG ADON: CPT

## 2019-06-06 PROCEDURE — 00000146 ZZHCL STATISTIC GLUCOSE BY METER IP

## 2019-06-06 PROCEDURE — 99226 ZZC SUBSEQUENT OBSERVATION CARE,LEVEL III: CPT | Performed by: PHYSICIAN ASSISTANT

## 2019-06-06 RX ORDER — CYCLOBENZAPRINE HCL 10 MG
TABLET ORAL
Qty: 30 TABLET | Refills: 0 | OUTPATIENT
Start: 2019-06-06

## 2019-06-06 RX ORDER — FUROSEMIDE 10 MG/ML
20 INJECTION INTRAMUSCULAR; INTRAVENOUS ONCE
Status: COMPLETED | OUTPATIENT
Start: 2019-06-06 | End: 2019-06-06

## 2019-06-06 RX ORDER — ASPIRIN 81 MG/1
81 TABLET, CHEWABLE ORAL DAILY
Status: DISCONTINUED | OUTPATIENT
Start: 2019-06-06 | End: 2019-06-07 | Stop reason: HOSPADM

## 2019-06-06 RX ORDER — NAPROXEN 500 MG/1
TABLET ORAL
Qty: 60 TABLET | Refills: 0 | OUTPATIENT
Start: 2019-06-06

## 2019-06-06 RX ADMIN — IRBESARTAN 150 MG: 150 TABLET, FILM COATED ORAL at 21:17

## 2019-06-06 RX ADMIN — ASPIRIN 81 MG 81 MG: 81 TABLET ORAL at 10:51

## 2019-06-06 RX ADMIN — Medication 33 MCI.: at 12:23

## 2019-06-06 RX ADMIN — ROSUVASTATIN CALCIUM 10 MG: 5 TABLET, FILM COATED ORAL at 08:19

## 2019-06-06 RX ADMIN — FUROSEMIDE 20 MG: 10 INJECTION, SOLUTION INTRAVENOUS at 10:51

## 2019-06-06 ASSESSMENT — MIFFLIN-ST. JEOR: SCORE: 2335.08

## 2019-06-06 NOTE — PROGRESS NOTES
[facility] Cardiology Progress Note             Assessment and Plan:   Assessment:   1.  German Cole is a pleasant 43-year-old male who presents for bilateral lower extremity edema.  It is not clear whether this is due to venous insufficiency.  He has 1+ bilateral peripheral edema.  This is improved with IV Lasix.  There is nothing to suggest high right heart pressures or pulmonary hypertension.  I suspect that this is related to his salt intake and his sedentary job.  In the meantime, his echocardiogram was read showing an anterolateral wall motion abnormality.  I think it is prudent to rule out coronary artery disease given his risk.  A two day nuclear stress test is in progress.  2.  The patient likely has type 2 diabetes mellitus.  Based on his hemoglobin A1c.  I would suggest diet and exercise to see if this can be reduced without medications.   3.  The patient's American Heart Association/ACC risk calculator showed a risk of 15.3% 10-year risk of heart disease or stroke.  I started him on a statin to lower his LDL below 70.   4.  Tobacco abuse.  I have encouraged smoking cessation.  I have suggested Chantix or nicotine patches or even cold turkey.  I have tried to motivate the patient to do so.  I have suggested that this patient begin an exercise program and see a dietitian.   6.  Hypertension.   Improved with irbesartan. This may also be related to his weight and salt intake.  In addition,   7.  The patient notes that he snores and has apneic periods when he sleeps.  He has not been tested for sleep apnea.  He should be tested as an outpatient.              Plan:   1.  Complete stress test tomorrow.  2.  Discharge tomorrow if stable.  3.  Management of risk factors as above.     Attestation:  I have reviewed today's vital signs, notes, medications, labs and imaging.  Care coordination / counseling time: 5 minutes  Face-to-face time: 10 minutes  Total time: 25 minutes    Daniel Velarde MD,  [facility]  June 6, 2019 5:57 PM        Interval History:   Pain free and denies shortness of breath.           Review of Systems:   A comprehensive review of systems was performed and found to be negative except as described in this note          Medications:       aspirin  81 mg Oral Daily     irbesartan  150 mg Oral At Bedtime     rosuvastatin  10 mg Oral Daily     sodium chloride (PF)  3 mL Intracatheter Q8H     acetaminophen, cyclobenzaprine, glucose **OR** dextrose **OR** glucagon, lidocaine 4%, lidocaine (buffered or not buffered), melatonin, naloxone, ondansetron **OR** ondansetron, polyethylene glycol, senna-docusate **OR** senna-docusate, sodium chloride (PF)             Physical Exam:   Blood pressure 125/79, pulse 94, temperature 96.8  F (36  C), temperature source Oral, resp. rate 16, height 1.829 m (6'), weight 139.7 kg (308 lb), SpO2 94 %.    Intake/Output Summary (Last 24 hours) at 6/6/2019 1752  Last data filed at 6/6/2019 0800  Gross per 24 hour   Intake 600 ml   Output 1275 ml   Net -675 ml     Rhythm:  NSR     Constitutional:   awake, alert, cooperative, no apparent distress, and appears stated age     Lungs:   No increased work of breathing, good air exchange, clear to auscultation bilaterally, no crackles or wheezing     Cardiovascular:   Normal apical impulse, regular rate and rhythm, normal S1 and S2, no S3 or S4, and no murmur noted     Abdomen:   No scars, normal bowel sounds, soft, non-distended, non-tender, no masses palpated, no hepatosplenomegally and mildly obese.     Musculoskeletal:   no lower extremity pitting edema present  there is no redness, warmth, or swelling of the joints     Neurologic:   Mental Status Exam:  Level of Alertness:   awake  Orientation:   person, place, time  Cranial Nerves:  cranial nerves II-XII are grossly intact            Data:   Recent Labs   Lab 06/05/19  0627 06/04/19  1647   WBC 5.2 7.7   HGB 14.3 15.0   HCT 41.8 43.5   MCV 92 92    272     Recent  Labs   Lab 06/06/19  0853 06/05/19  0627 06/04/19  1647    137 138   POTASSIUM 3.9 3.8 3.8   CHLORIDE 103 106 106   CO2 26 27 25   ANIONGAP 6 4 7   * 136* 98   BUN 12 12 11   CR 0.93 0.86 0.94   GFRESTIMATED >90 >90 >90   GFRESTBLACK >90 >90 >90   JACQUELINE 8.4* 8.3* 8.8             EKG results:   I have reviewed this patient's telemetry with the following interpretation:        Rate: 94  Normal sinus rhythm      Other cardiac studies:   Nuclear stress test is pending.    Radiology:            Daniel Velarde MD, FACC 6/6/2019  5:52 PM

## 2019-06-06 NOTE — PLAN OF CARE
PRIMARY DIAGNOSIS: CHEST PAIN  OUTPATIENT/OBSERVATION GOALS TO BE MET BEFORE DISCHARGE:  1. Ruled out acute coronary syndrome (negative or stable Troponin):  Yes  2. Pain Status: Pain free.  3. Appropriate provocative testing performed: No  - Stress Test Procedure: Nuclear  - Interpretation of cardiac rhythm per telemetry tech: SR with ST depression (PA notified)    4. Cleared by Consultants (if applicable):no, cards following  5. Return to near baseline physical activity: Yes  Discharge Planner Nurse   Safe discharge environment identified: Yes  Barriers to discharge: No       Entered by: Namrata Asencio 06/06/2019 11:04 AM   VSS. Pt c/o of slight pain in L knee. Pt states SOB is improving. Orders for IV lasix time x1. Plan for part one of lexiscan today.   Please review provider order for any additional goals.   Nurse to notify provider when observation goals have been met and patient is ready for discharge.

## 2019-06-06 NOTE — PLAN OF CARE
PRIMARY DIAGNOSIS: CHEST PAIN  OUTPATIENT/OBSERVATION GOALS TO BE MET BEFORE DISCHARGE:  1. Ruled out acute coronary syndrome (negative or stable Troponin):  Yes  2. Pain Status: Pain free.  3. Appropriate provocative testing performed: No  - Stress Test Procedure: Lexiscan  - Interpretation of cardiac rhythm per telemetry tech: Sinus rhythm in 80s    4. Cleared by Consultants (if applicable):No  5. Return to near baseline physical activity: Yes  Discharge Planner Nurse   Safe discharge environment identified: Yes  Barriers to discharge: No       Entered by: Chetna Muñoz 06/06/2019 2:17 AM     Please review provider order for any additional goals.   Nurse to notify provider when observation goals have been met and patient is ready for discharge.  Vitals are Temp: 97.4  F (36.3  C) Temp src: Oral BP: 130/82 Pulse: 93 Heart Rate: 120 Resp: 16 SpO2: 98 %.  Patient is Alert and Oriented x4. They are independent with Activity.  Pt is a Diabetic diet.  They are denying pain.  Patient is Saline locked. Plan of care is two day lexiscan.

## 2019-06-06 NOTE — PLAN OF CARE
PRIMARY DIAGNOSIS: CHEST PAIN  OUTPATIENT/OBSERVATION GOALS TO BE MET BEFORE DISCHARGE:  1. Ruled out acute coronary syndrome (negative or stable Troponin):  Yes  2. Pain Status: Pain free.  3. Appropriate provocative testing performed: No  - Stress Test Procedure: Lexiscan  - Interpretation of cardiac rhythm per telemetry tech: Sinus rhythm in 80s    4. Cleared by Consultants (if applicable):No  5. Return to near baseline physical activity: Yes  Discharge Planner Nurse   Safe discharge environment identified: Yes  Barriers to discharge: No       Entered by: Chetna Muñoz 06/06/2019 5:24 AM     Please review provider order for any additional goals.   Nurse to notify provider when observation goals have been met and patient is ready for discharge.  Vitals are Temp: (P) 96.6  F (35.9  C) Temp src: (P) Oral BP: (P) 108/56 Pulse: 93 Heart Rate: (P) 75 Resp: (P) 16 SpO2: (P) 95 %.  Patient is Alert and Oriented x4. They are independent with Activity.  Pt is a Diabetic diet.  They are denying pain.  Patient is Saline locked. Plan of care is two day lexiscan.

## 2019-06-06 NOTE — PLAN OF CARE
PRIMARY DIAGNOSIS: CHEST PAIN  OUTPATIENT/OBSERVATION GOALS TO BE MET BEFORE DISCHARGE:  1. Ruled out acute coronary syndrome (negative or stable Troponin):  Yes, trop <0.015 x 1  2. Pain Status: Pain free.  3. Appropriate provocative testing performed: Yes  - Stress Test Procedure: Lexiscan  - Interpretation of cardiac rhythm per telemetry tech: NSR HR 90s    4. Cleared by Consultants (if applicable):No  5. Return to near baseline physical activity: Yes  Discharge Planner Nurse   Safe discharge environment identified: Yes  Barriers to discharge: No       Entered by: Alena Santiago 06/06/2019 3:26 PM     Please review provider order for any additional goals.   Nurse to notify provider when observation goals have been met and patient is ready for discharge.  Pt is resting in bed, currently denying pain, family at bedside. Day 1 of lexiscan done today, plan to complete lexiscan tomorrow. Continue tele monitoring, pt started baby ASA today, encourage smoking cessation, BG checks BID. Continue to monitor for chest pain.

## 2019-06-06 NOTE — PROGRESS NOTES
[facility]  Hospitalist Progress Note  Leslee Hampton PA-C 06/06/2019    Reason for Stay (Diagnosis): Concern for ACS         Assessment and Plan:      Summary of Stay: German Cole is a 43 year old male admitted on 6/4/2019 with bilateral lower leg swelling and dyspnea on exertion. He has no known significant prior medical history aside from 20 pack-year smoker (does not see a doctor regularly). Work up was negative for liver or renal source to swelling but A1c was elevated to 6.7. Swelling improved with Lasix. Echocardiogram showed anterolateral wall motion abnormality so Cardiology was consulted and recommend Lexiscan which will be done over 2 days.     1.  Bilateral lower leg swelling, left knee swelling/pain, ABRAMS  Presented with bilateral lower extremity swelling that has likely been slowly increasing over the last couple months with acute worsening on 5/30 with development of pain in both legs worse on the left knee area on 6/2.  He also notes a 30 to 40 pound weight gain but denies orthopnea and exertional chest discomfort.  His BNP is normal. Responded well to Lasix IV. No protein in urine, albumin and LFTs are normal.  Echocardiogram shows preserved EF but does have anterolateral wall motion abnormalities noted per cardiology read  -Repeat Lasix 20 mg IV today  -BMP today  -Needs outpatient sleep study (wife comments on snoring)       2. Previous chest discomfort  2 months ago patient had 2 episodes of nonexertional sharp chest tightness lasting minutes without radiation or other concerning symptoms such as nausea, diaphoresis or shortness of breath.  He thought it was related to reflux and took both Pepto-Bismol and Tums without significant improvement. He is a current smoker. Echo with wall motion abnormality and Cardiology recommending Nan.  -Lexiscan today (will need 2 day).  -Start baby Aspirin    3. Nicotine abuse  Current every day smoker about 1 pack/week, has cut down  this past year from 1 pack per day. Has a 20 pack-year history.  -Encouraged smoking cessation     4. New diagnosis of DM II  A1c 6.7, so meets criteria for type 2 diabetes mellitus. Cardiology recommending diet and exercise.  -BID glucose checks  -Moderate carb diet  -Follow up with PCP     5. New diagnosis of HLD  Lipid panel shows T chol 169,  (H), HDL 35 (L), . According to ACC/AHA ASCVD Heart Risk Calculator, has 15.3% 10-year risk of heart disease or stroke, and per guideline therapy should likely be statin therapy.   -Rosuvastatin 10 mg      6. HTN  BPs have been moderately elevated, -160s, DBP 80-90s.  -Irbesartan 150 mg        DVT Prophylaxis: Ambulate every shift  Code Status: Full Code  Discharge Dispo: Anticipate discharge tomorrow after Nan          Interval History (Subjective):      Reports improved leg pain and swelling today. No chest discomfort or shortness of breath. Eating and drinking without difficulty.                   Physical Exam:      Last Vital Signs:  /59 (BP Location: Left arm)   Pulse 83   Temp 97.3  F (36.3  C) (Oral)   Resp 16   Ht 1.829 m (6')   Wt 143.2 kg (315 lb 11.2 oz)   SpO2 96%   BMI 42.82 kg/m        Intake/Output Summary (Last 24 hours) at 6/6/2019 0929  Last data filed at 6/6/2019 0800  Gross per 24 hour   Intake 600 ml   Output 1450 ml   Net -850 ml       Constitutional: Awake, alert, cooperative, no apparent distress   Respiratory: Clear to auscultation bilaterally, no crackles or wheezing   Cardiovascular: Regular rate and rhythm, normal S1 and S2, and no murmur noted   Abdomen: Normal bowel sounds, soft, non-distended, non-tender   Skin: No rashes, no cyanosis, dry to touch   Neuro: Alert and oriented x3, no weakness, numbness, memory loss   Extremities: Trace bilateral peripheral edema, normal range of motion   Other(s):        All other systems: Negative          Medications:      All current medications were reviewed with changes  reflected in problem list.         Data:      All new lab and imaging data was reviewed.   Labs:  Recent Labs   Lab 06/06/19  0853      POTASSIUM 3.9   CHLORIDE 103   CO2 PENDING   ANIONGAP PENDING   GLC PENDING   BUN PENDING   CR PENDING   GFRESTIMATED PENDING   GFRESTBLACK PENDING   JACQUELINE PENDING     Recent Labs   Lab 06/05/19  0627   WBC 5.2   HGB 14.3   HCT 41.8   MCV 92         Imaging:   No results found for this or any previous visit (from the past 24 hour(s)).

## 2019-06-06 NOTE — PROGRESS NOTES
Care Transition Team    Patient admitted with LE edema and knee pain. Patient newly diagnosed with DM II and HLD. Patient started on rosuvastatin and irbesartan. Met with patient to discuss diabetes diagnosis and management.   Information on healthy eating with diabetes and heart disease reviewed with patient. Patient given Calorie [place] booklet. Discussed need for ongoing management and blood sugar monitoring. Patient does not have glucometer. Diabetic supply order sheet initiated and placed on front of chart for provider to complete.  Patient's wife is at the bedside and states she will assist patient in establishing care with [facility]. Patient encouraged to also follow up with a diabetic nurse educator upon discharge.     Follow up appointment previously scheduled with RJ Joseph. Pt to connect with clinic if his plans changes. Clinic phone contact listed on avs.     Shay Tam RN BSN CTS  Care Management Team  [facility]

## 2019-06-06 NOTE — PLAN OF CARE
Patient Improving    PRIMARY DIAGNOSIS: CHEST PAIN  OUTPATIENT/OBSERVATION GOALS TO BE MET BEFORE DISCHARGE:  1. Ruled out acute coronary syndrome (negative or stable Troponin):  Yes  2. Pain Status: Pain free.  3. Appropriate provocative testing performed: No  - Stress Test Procedure: 2 day Lexiscan  - Interpretation of cardiac rhythm per telemetry tech: SR HR 80s    4. Cleared by Consultants (if applicable):No  5. Return to near baseline physical activity: Yes  Vitals are Temp: 97.4  F (36.3  C) Temp src: Oral BP: 130/82 Pulse: 93 Heart Rate: 120 Resp: 16 SpO2: 98 %.  Patient is Alert and Oriented x4. They are independent with Activity.  Pt is a Mod carb/low sat fat diet.  They are denying pain.  Patient is Saline locked.  Pt glucose is 122.  Plan of care is a 2 day lexiscan.    Discharge Planner Nurse   Safe discharge environment identified: Yes  Barriers to discharge: No            Please review provider order for any additional goals.   Nurse to notify provider when observation goals have been met and patient is ready for discharge.

## 2019-06-07 ENCOUNTER — APPOINTMENT (OUTPATIENT)
Dept: NUCLEAR MEDICINE | Facility: CLINIC | Age: 44
End: 2019-06-07
Attending: INTERNAL MEDICINE
Payer: COMMERCIAL

## 2019-06-07 VITALS
WEIGHT: 309.6 LBS | HEIGHT: 72 IN | DIASTOLIC BLOOD PRESSURE: 81 MMHG | RESPIRATION RATE: 18 BRPM | BODY MASS INDEX: 41.93 KG/M2 | OXYGEN SATURATION: 94 % | HEART RATE: 103 BPM | TEMPERATURE: 98.8 F | SYSTOLIC BLOOD PRESSURE: 135 MMHG

## 2019-06-07 LAB — GLUCOSE BLDC GLUCOMTR-MCNC: 127 MG/DL (ref 70–99)

## 2019-06-07 PROCEDURE — 00000146 ZZHCL STATISTIC GLUCOSE BY METER IP

## 2019-06-07 PROCEDURE — 99217 ZZC OBSERVATION CARE DISCHARGE: CPT | Performed by: INTERNAL MEDICINE

## 2019-06-07 PROCEDURE — 34300033 ZZH RX 343: Performed by: HOSPITALIST

## 2019-06-07 PROCEDURE — 25000132 ZZH RX MED GY IP 250 OP 250 PS 637: Performed by: INTERNAL MEDICINE

## 2019-06-07 PROCEDURE — G0378 HOSPITAL OBSERVATION PER HR: HCPCS

## 2019-06-07 PROCEDURE — A9502 TC99M TETROFOSMIN: HCPCS | Performed by: HOSPITALIST

## 2019-06-07 PROCEDURE — 25000132 ZZH RX MED GY IP 250 OP 250 PS 637: Performed by: PHYSICIAN ASSISTANT

## 2019-06-07 RX ORDER — ASPIRIN 81 MG/1
81 TABLET, CHEWABLE ORAL DAILY
Qty: 30 TABLET | Refills: 0 | Status: SHIPPED | OUTPATIENT
Start: 2019-06-08

## 2019-06-07 RX ORDER — IRBESARTAN 150 MG/1
150 TABLET ORAL AT BEDTIME
Qty: 30 TABLET | Refills: 0 | Status: SHIPPED | OUTPATIENT
Start: 2019-06-07

## 2019-06-07 RX ORDER — ROSUVASTATIN CALCIUM 10 MG/1
10 TABLET, COATED ORAL DAILY
Qty: 30 TABLET | Refills: 0 | Status: SHIPPED | OUTPATIENT
Start: 2019-06-08

## 2019-06-07 RX ADMIN — ROSUVASTATIN CALCIUM 10 MG: 5 TABLET, FILM COATED ORAL at 10:08

## 2019-06-07 RX ADMIN — ASPIRIN 81 MG 81 MG: 81 TABLET ORAL at 10:08

## 2019-06-07 RX ADMIN — Medication 34 MILLICURIE: at 07:07

## 2019-06-07 RX ADMIN — ACETAMINOPHEN 1000 MG: 500 TABLET, FILM COATED ORAL at 04:48

## 2019-06-07 ASSESSMENT — MIFFLIN-ST. JEOR: SCORE: 2342.34

## 2019-06-07 NOTE — PROGRESS NOTES
Glucometer teaching done.  Teach back performed.  Patient stated understanding of how to check blood sugars at home and will bring log to PCP appointment on 6/19/19.

## 2019-06-07 NOTE — PLAN OF CARE
Patient's After Visit Summary was reviewed with patient and/or spouse.   Patient verbalized understanding of After Visit Summary, recommended follow up and was given an opportunity to ask questions.   Discharge medications sent home with patient/family: Glucometer sent with patient.    Discharged with spouse      OBSERVATION patient END time: 9724

## 2019-06-07 NOTE — DISCHARGE SUMMARY
[facility]  Discharge Summary  Name: German Cole    MRN: [id number]  YOB: 1975    Age: 43 year old  Date of Discharge:  6/7/2019  Date of Admission: 6/4/2019  Primary Care Provider: [facility], [facility]  Discharge Physician:  Rashida Fischer MD  Discharging Service:  Hospitalist      Discharge Diagnoses:  1.  Bilateral lower extremity edema  2.  Chest pain and dyspnea on exertion  3.  Tobacco use disorder  4.  Newly diagnosed type 2 diabetes  5.  Newly diagnosed hyperlipidemia  6.  Hypertension     Follow-ups Needed After Discharge   Follow-up with primary care doctor on 6/19 as already scheduled    Unresulted Labs Ordered in the Past 30 Days of this Admission     No orders found from 10/16/2018 to 12/16/2018.        Hospital Course:  German Cole is a 43 year old male with past medical history of tobacco use disorder and obesity (he does not regularly see a physician) who was admitted on 6/4/2019 with bilateral lower leg swelling and dyspnea on exertion as well as chest pressure.  He was found to have newly diagnosed type 2 diabetes.  He had an echo which did show regional wall motion abnormalities and subsequently a Lexiscan which was negative for ischemia.      1.  Bilateral lower leg swelling, left knee swelling/pain, ABRAMS  Presented with bilateral lower extremity swelling that has likely been slowly increasing over the last couple months with acute worsening on 5/30 with development of pain in both legs worse on the left knee area on 6/2.  He also notes a 30 to 40 pound weight gain but denies orthopnea and exertional chest discomfort.  His BNP is normal. Responded well to Lasix IV. No protein in urine, albumin and LFTs are normal.  Echocardiogram shows preserved EF but does have anterolateral wall motion abnormalities noted per cardiology read (see below).  He does not have any diastolic heart failure.  His lower extremity edema is not consistent with heart failure  and is likely venous insufficiency.  He will not be discharged on diuretics.  He should follow a low-salt diet and work on weight loss.     2. Previous chest discomfort with wall motion abnormality on TTE  2 months ago patient had 2 episodes of nonexertional sharp chest tightness lasting minutes without radiation or other concerning symptoms such as nausea, diaphoresis or shortness of breath.  He thought it was related to reflux and took both Pepto-Bismol and Tums without significant improvement. He is a current smoker. Echo with wall motion abnormality so cardiology was consulted.  They did recommend Nan scan which showed no ischemia.  He was started on a baby aspirin as well as a statin.  Weight loss as well as smoking cessation was discussed.     3. Nicotine abuse  Current every day smoker about 1 pack/week, has cut down this past year from 1 pack per day. Has a 20 pack-year history.  Smoking cessation was discussed.     4. New diagnosis of DM II  A1c 6.7, so meets criteria for type 2 diabetes mellitus.   At this point recommendation is for diet and exercise.  He will follow-up with his primary care doctor.  He was given a glucometer and recommend that he check his glucose daily and bring us to his follow-up appointment.     5. New diagnosis of HLD  Lipid panel shows T chol 169,  (H), HDL 35 (L), . According to ACC/AHA ASCVD Heart Risk Calculator, has 15.3% 10-year risk of heart disease or stroke, and per guideline therapy should likely be statin therapy.   He was started on Crestor.     6. HTN  Patient was found to be hypertensive this admission.  He was started on Irbesartan and tolerating this well.     Discharge Disposition:  Discharged to home     Allergies:  No Known Allergies     Condition on Discharge:  Discharge condition: Stable   Discharge vitals: Blood pressure 129/72, pulse 95, temperature 98.4  F (36.9  C), temperature source Oral, resp. rate 16, height 1.829 m (6'), weight 140.4 kg  (309 lb 9.6 oz), SpO2 94 %.   Code status on discharge: Full Code     History of Illness:  See detailed admission note for full details.    Physical Exam:  Blood pressure 129/72, pulse 95, temperature 98.4  F (36.9  C), temperature source Oral, resp. rate 16, height 1.829 m (6'), weight 140.4 kg (309 lb 9.6 oz), SpO2 94 %.  Wt Readings from Last 1 Encounters:   06/07/19 140.4 kg (309 lb 9.6 oz)     General: Alert, awake, no acute distress.  HEENT: Normocephalic, atraumatic, eyes anicteric and without scleral injection, EOMI, MMM.  Cardiac: RRR, normal S1, S2.  No m/g/r. Trace LE edema.  Pulmonary: Normal chest rise, normal work of breathing.  Lungs CTAB without crackles or wheezing  Abdomen: soft, non-tender, non-distended.  Normoactive BS.  No guarding or rebound tenderness.  Extremities: no deformities.  Warm, well perfused.  Skin: no rashes or lesions noted.  Warm and Dry.  Neuro: No focal deficits noted.  Speech clear.  Coordination and strength grossly normal.  Psych: Appropriate affect. Alert and oriented x3    Procedures other than Imaging:  TTE     Imaging:  Results for orders placed or performed during the hospital encounter of 06/04/19   Chest XR,  PA & LAT    Narrative    XR CHEST 2 VW 6/4/2019 5:30 PM    COMPARISON: None.    HISTORY: Chest pain, dyspnea with exertion.      Impression    IMPRESSION: Cardiac silhouette and pulmonary vasculature are within  normal limits. No focal airspace disease, pleural effusion or  pneumothorax.    DAVID KRUSE MD   NM MPI Multi Rest Stress    Narrative    GATED  MYOCARDIAL PERFUSION SCINTIGRAPHY EXERCISE- TWO DAY STUDY     6/7/2019 8:26 AM  ILAN STERN  43 years  Male  1975.  BMI 42.    Exams Performed on: Rest June 7, 2019 and Stress June 6, 2019    Indication/Clinical History: 43-year-old male with no cardiac history  undergoing stress test for abnormal echo and edema.    Impression       1. Exercise: Below average functional capacity, 92% target  heart  rate achieved       2. EKG: Negative for ischemia       3. Symptoms: No chest pain or other symptoms during exercise  4. Myocardial perfusion imaging using single isotope technique  demonstrated normal perfusion. No ischemia or infarct.   5. Gated images demonstrated normal wall motion.  The left ventricular  systolic function is greater than 75% at rest or with stress.  6. No previous study for comparison.    Procedure  The patient performed treadmill exercise using a Edgar protocol,  completing 5 minutes and 0 seconds with an estimated workload of 6.7  METS.  The test was terminated due to fatigue. The heart rate was 108  beats per minute at baseline and increased to 164 beats at peak  exercise, which was 92% of the maximum predicted heart rate. The rest  blood pressure was 142/90 mm/Hg and peak blood pressure is 174/90mm/Hg  with rate pressure product of 28,500. The patient experienced no chest  pain during the test. The patient was not on a beta blocker.    Myocardial perfusion imaging was performed at rest, approximately 45  minutes after the injection intravenously of 34.0of Tc-99m Myoview. At  peak exercise, the patient was injected intravenously with 33.0 mCi of   Tc-99m Myoview and exercise continued for approximately 1 minute.  Gated post-stress tomographic imaging was performed approximately 30  minutes after stress    EKG Findings  The resting EKG demonstrated sinus rhythm, no baseline ST  abnormalities . The stress EKG demonstrated no changes from baseline,  negative for ischemia.    Tomographic Findings  Overall, the study quality is good . On the stress images, normal  perfusion. On the rest images, normal perfusion . Gated images  demonstrated normal wall motion. The left ventricular ejection  fraction was calculated to be greater than 75% at rest and with  stress. TID was 0.9, normal.      MARCIAL JIMNEEZ MD        Consultations:  Consultations This Hospital Stay   CARE COORDINATOR IP  CONSULT  CARDIOLOGY IP CONSULT  CARE COORDINATOR IP CONSULT     Recent Lab Results:  Recent Labs   Lab 06/05/19  0627 06/04/19  1647   WBC 5.2 7.7   HGB 14.3 15.0   HCT 41.8 43.5   MCV 92 92    272     Recent Labs   Lab 06/06/19  0853 06/05/19  0627 06/04/19  1647    137 138   POTASSIUM 3.9 3.8 3.8   CHLORIDE 103 106 106   CO2 26 27 25   ANIONGAP 6 4 7   * 136* 98   BUN 12 12 11   CR 0.93 0.86 0.94   GFRESTIMATED >90 >90 >90   GFRESTBLACK >90 >90 >90   JACQUELINE 8.4* 8.3* 8.8     Recent Labs   Lab 06/04/19  1647   TROPI <0.015          Pending Results:    Unresulted Labs Ordered in the Past 30 Days of this Admission     No orders found from 4/5/2019 to 6/5/2019.           Discharge Instructions and Follow-Up:   Discharge Orders      Reason for your hospital stay    You were hospitalized for leg swelling and chest pain.  You were found to have a new diagnosis of diabetes.  You had a stress test which showed no ischemia.     Follow-up and recommended labs and tests     Follow up with primary care doctor as scheduled.     Activity    Your activity upon discharge: activity as tolerated     Monitor and record    blood glucose daily, bring a record of this to your primary care appointment.     Full Code     Diet    Follow this diet upon discharge: Orders Placed This Encounter      Combination Diet 2327-4488 Calories: Moderate Consistent CHO (4-6 CHO units/meal); Low Saturated Fat Na <2400mg Diet     Discharge Medications   Current Discharge Medication List      START taking these medications    Details   aspirin (ASA) 81 MG chewable tablet Take 1 tablet (81 mg) by mouth daily  Qty: 30 tablet, Refills: 0    Associated Diagnoses: Chest pain on exertion      irbesartan (AVAPRO) 150 MG tablet Take 1 tablet (150 mg) by mouth At Bedtime  Qty: 30 tablet, Refills: 0    Associated Diagnoses: Chest pain on exertion      rosuvastatin (CRESTOR) 10 MG tablet Take 1 tablet (10 mg) by mouth daily  Qty: 30 tablet, Refills:  0    Associated Diagnoses: Chest pain on exertion         CONTINUE these medications which have NOT CHANGED    Details   acetaminophen (TYLENOL) 500 MG tablet Take 500-1,000 mg by mouth 2 times daily as needed for mild pain      cyclobenzaprine (FLEXERIL) 10 MG tablet Take 10 mg by mouth nightly as needed for muscle spasms      naproxen (NAPROSYN) 500 MG tablet Take 500 mg by mouth 2 times daily (with meals)             Time Spent on this Encounter   I, Rashida Fischer, personally saw the patient today and spent greater than 30 minutes discharging this patient.    Rashida Fischer MD

## 2019-06-07 NOTE — PLAN OF CARE
Patient Improving    PRIMARY DIAGNOSIS: CHEST PAIN  OUTPATIENT/OBSERVATION GOALS TO BE MET BEFORE DISCHARGE:  1. Ruled out acute coronary syndrome (negative or stable Troponin):  Yes  2. Pain Status: Pain free.  3. Appropriate provocative testing performed: Yes  - Stress Test Procedure: 2 day Lexiscan  - Interpretation of cardiac rhythm per telemetry tech: SR with HR 77    4. Cleared by Consultants (if applicable):No  5. Return to near baseline physical activity: Yes  Vitals are Temp: 98.2  F (36.8  C) Temp src: Oral BP: 112/81 Pulse: 94 Heart Rate: 100 Resp: 18 SpO2: 94 %.  Patient is Alert and Oriented x4. They are independent with Activity.  Pt is a Diabetic and Low fat diet.  They are denying pain.  Patient is Saline locked.  Plan of care is the 2nd day of the lexiscan tomorrow.     Discharge Planner Nurse   Safe discharge environment identified: Yes  Barriers to discharge: No          Please review provider order for any additional goals.   Nurse to notify provider when observation goals have been met and patient is ready for discharge.

## 2019-06-07 NOTE — PLAN OF CARE
Patient Improving    PRIMARY DIAGNOSIS: CHEST PAIN  OUTPATIENT/OBSERVATION GOALS TO BE MET BEFORE DISCHARGE:  1. Ruled out acute coronary syndrome (negative or stable Troponin):  Yes  2. Pain Status: Pain free.  3. Appropriate provocative testing performed: Yes  - Stress Test Procedure: 2 day Lexiscan  - Interpretation of cardiac rhythm per telemetry tech: SR with HR 77    4. Cleared by Consultants (if applicable):No  5. Return to near baseline physical activity: Yes  Vitals are Temp: 96.5  F (35.8  C) Temp src: Oral BP: 119/78 Pulse: 104 Heart Rate: 100 Resp: 18 SpO2: 92 %.  Patient is Alert and Oriented x4. They are independent with Activity.  Pt is a Diabetic and Low fat diet.  They are denying pain.  Patient is Saline locked.  Plan of care is the 2nd day of the lexiscan today.     Discharge Planner Nurse   Safe discharge environment identified: Yes  Barriers to discharge: No          Please review provider order for any additional goals.   Nurse to notify provider when observation goals have been met and patient is ready for discharge.

## 2019-06-07 NOTE — PLAN OF CARE
PRIMARY DIAGNOSIS: BLE edema, chest discomfort, ABRAMS  OUTPATIENT/OBSERVATION GOALS TO BE MET BEFORE DISCHARGE:  1. Ruled out acute coronary syndrome (negative or stable Troponin):  Yes  2. Pain Status: Pain free.  3. Appropriate provocative testing performed: Yes  - Stress Test Procedure: Lexiscan  - Interpretation of cardiac rhythm per telemetry tech: SR in 90s    4. Cleared by Consultants (if applicable):Yes  5. Return to near baseline physical activity: Yes  Discharge Planner Nurse   Safe discharge environment identified: Yes  Barriers to discharge: No       Entered by: Shefali Balderas 06/07/2019        A&O x 4.  Up independent.  BLE edema resolved and legs back to baseline per patient.  Denies SOB/ABRAMS and chest discomfort.  Plan for 2nd part of lexiscan test today.  BP WNL after starting irbesartan.  Glucometer ordered from discharge pharmacy.  Will do teaching with patient once it arrives.    Please review provider order for any additional goals.   Nurse to notify provider when observation goals have been met and patient is ready for discharge.

## 2020-08-21 ENCOUNTER — HOSPITAL ENCOUNTER (EMERGENCY)
Facility: CLINIC | Age: 45
Discharge: HOME | End: 2020-08-21
Attending: EMERGENCY MEDICINE | Admitting: EMERGENCY MEDICINE
Payer: COMMERCIAL

## 2020-08-21 VITALS
DIASTOLIC BLOOD PRESSURE: 91 MMHG | RESPIRATION RATE: 18 BRPM | HEART RATE: 88 BPM | SYSTOLIC BLOOD PRESSURE: 126 MMHG | TEMPERATURE: 98.5 F | OXYGEN SATURATION: 98 %

## 2020-08-21 DIAGNOSIS — H10.31 ACUTE CONJUNCTIVITIS OF RIGHT EYE, UNSPECIFIED ACUTE CONJUNCTIVITIS TYPE: ICD-10-CM

## 2020-08-21 PROCEDURE — 99283 EMERGENCY DEPT VISIT LOW MDM: CPT

## 2020-08-21 RX ORDER — TETRACAINE HYDROCHLORIDE 5 MG/ML
SOLUTION OPHTHALMIC
Status: DISCONTINUED
Start: 2020-08-21 | End: 2020-08-21 | Stop reason: HOSPADM

## 2020-08-21 RX ORDER — CEPHALEXIN 500 MG/1
500 CAPSULE ORAL 3 TIMES DAILY
Qty: 28 CAPSULE | Refills: 0 | Status: SHIPPED | OUTPATIENT
Start: 2020-08-21 | End: 2020-08-28

## 2020-08-21 RX ORDER — OFLOXACIN 3 MG/ML
1-2 SOLUTION/ DROPS OPHTHALMIC
Qty: 1 BOTTLE | Refills: 0 | Status: SHIPPED | OUTPATIENT
Start: 2020-08-21

## 2020-08-21 RX ORDER — DICLOFENAC SODIUM 1 MG/ML
1 SOLUTION/ DROPS OPHTHALMIC 4 TIMES DAILY
Qty: 10 ML | Refills: 0 | Status: SHIPPED | OUTPATIENT
Start: 2020-08-21

## 2020-08-21 ASSESSMENT — ENCOUNTER SYMPTOMS
EYE DISCHARGE: 1
EYE PAIN: 1
EYE REDNESS: 1

## 2020-08-21 NOTE — ED TRIAGE NOTES
Patient presents to ED d/t swelling to R eye that developed last night, had some mattering.. Denies any difficulty swallowing,rash or blurred vision .     ABC intact

## 2020-08-21 NOTE — DISCHARGE INSTRUCTIONS
"Discharge Instructions  Conjunctivitis  Conjunctivitis, or \"pinkeye\", is inflammation of the conjunctiva which is the thin membrane that lines the inner surface of the eyelids and the whites of the eyes.   There are four main types of conjunctivitis: viral, bacterial, allergic, and non-specific. Both bacterial and viral conjunctivitis spread easily from one person to another by contact with the eye or another person s hands, by an object the infected person has touched, such as a door handle, or by sharing an object that has touched their eye such as a towel or pillow case. Because of this, children with bacterial conjunctivitis can t go back to school or  until they have been on antibiotic drops for 24 hours.  VIRAL CONJUNCTIVITIS:  This is typically caused by the virus that also causes the common cold and is often seen as part of a general cold.  This type of conjunctivitis is not treated with antibiotics, and usually lasts 3 - 5 days.  An over the counter antihistamine/decongestant eye drop may help to relieve the itching and irritation of viral conjunctivitis.  BACTERIAL CONJUNCTIVITIS:  This is treated with an antibiotic ointment or eye drop.  In both bacterial and viral conjunctivitis, do not wear contact lenses until your eye is no longer red.   Your contact case should be thrown away and the contacts disinfected overnight, or replaced if disposable.  NON SPECIFIC CONJUNCTIVITIS: Sometimes a red eye is caused by other things such as dry eye, chemical exposure, or foreign body in the eye such as dust or eyelash.   All of these problems generally improve on their own within 24 hours.  ALLERGIC CONJUNCTIVITIS: These are eye symptoms caused by allergies. This type of conjunctivitis will be treated with allergy medications.    Return to the Emergency Department if:  If you have blurry vision.  If you have increasing eye pain or drainage.  If you have redness or swelling in the skin around the eye.  If you " have a fever.    Follow-up with your doctor:    Your eye should improve within 2 days, if it does not, return to the Emergency Department or see your regular doctor for a second eye exam.  If you were given a prescription for medicine here today, be sure to read all of the information (including the package insert) that comes with your prescription.  This will include important information about the medicine, its side effects, and any warnings that you need to know about.  The pharmacist who fills the prescription can provide more information and answer questions you may have about the medicine.  If you have questions or concerns that the pharmacist cannot address, please call or return to the Emergency Department.       Opioid Medication Information    Pain medications are among the most commonly prescribed medicines, so we are including this information for all our patients. If you did not receive pain medication or get a prescription for pain medicine, you can ignore it.     You may have been given a prescription for an opioid (narcotic) pain medicine and/or have received a pain medicine while here in the Emergency Department. These medicines can make you drowsy or impaired. You must not drive, operate dangerous equipment, or engage in any other dangerous activities while taking these medications. If you drive while taking these medications, you could be arrested for DUI, or driving under the influence. Do not drink any alcohol while you are taking these medications.     Opioid pain medications can cause addiction. If you have a history of chemical dependency of any type, you are at a higher risk of becoming addicted to pain medications.  Only take these prescribed medications to treat your pain when all other options have been tried. Take it for as short a time and as few doses as possible. Store your pain pills in a secure place, as they are frequently stolen and provide a dangerous opportunity for children or  visitors in your house to start abusing these powerful medications. We will not replace any lost or stolen medicine.  As soon as your pain is better, you should flush all your remaining medication.     Many prescription pain medications contain Tylenol  (acetaminophen), including Vicodin , Tylenol #3 , Norco , Lortab , and Percocet .  You should not take any extra pills of Tylenol  if you are using these prescription medications or you can get very sick.  Do not ever take more than 3000 mg of acetaminophen in any 24 hour period.    All opioids tend to cause constipation. Drink plenty of water and eat foods that have a lot of fiber, such as fruits, vegetables, prune juice, apple juice and high fiber cereal.  Take a laxative if you don t move your bowels at least every other day. Miralax , Milk of Magnesia, Colace , or Senna  can be used to keep you regular.      Remember that you can always come back to the Emergency Department if you are not able to see your regular doctor in the amount of time listed above, if you get any new symptoms, or if there is anything that worries you.

## 2020-08-21 NOTE — ED AVS SNAPSHOT
[facility] Emergency Department  [address]  [facility] [address]  Phone:  [phone]  Fax:  [phone]                                    German Cole   MRN: [id number]    Department:  [facility] Emergency Department   Date of Visit:  8/21/2020           After Visit Summary Signature Page    I have received my discharge instructions, and my questions have been answered. I have discussed any challenges I see with this plan with the nurse or doctor.    ..........................................................................................................................................  Patient/Patient Representative Signature      ..........................................................................................................................................  Patient Representative Print Name and Relationship to Patient    ..................................................               ................................................  Date                                   Time    ..........................................................................................................................................  Reviewed by Signature/Title    ...................................................              ..............................................  Date                                               Time          22EPIC Rev 08/18

## 2020-08-21 NOTE — ED PROVIDER NOTES
History     Chief Complaint:  Eye Pain      HPI   German Cole is a 44 year old male who presents with eye pain which began Monday when his daughter tried to remove his glasses and her fingernail went in his eye. The patient developed right upper lid swelling last night and has some mattering. He denies any difficulty swallowing, rash, or blurred vision, and no pain when he moves his eye.    Allergies:  No known drug allergies    Medications:    Tylenol  Aspirin  Flexeril  Avapro  Naprosyn  Crestor    Past Medical History:    Subluxation of left shoulder girdle      Past Surgical History:    As diskectomy      Family History:    Back pain      Social History:  Smoking status: everyday  Alcohol use: yes  Drug use: no  PCP: [facility]  Marital Status:   [2]       Review of Systems   Eyes: Positive for pain (eye swelling (+)), discharge and redness. Negative for visual disturbance.   All other systems reviewed and are negative.      Physical Exam     Patient Vitals for the past 24 hrs:   BP Temp Temp src Pulse Resp SpO2   08/21/20 0649 (!) 126/91 -- -- -- 18 98 %   08/21/20 0531 (!) 150/107 98.5  F (36.9  C) Oral 88 18 100 %       Physical Exam  GEN: alert    HEAD: atraumatic    EYES: pupils reactive, extraocular muscles intact, conjunctivae normal. Upper lid right eye swollen, eye movement intact, visual acuity intact, white mattering.  Upper lids lower lid everted and showed no evidence of foreign body.  Slit lamp examination of the eye reveals no acute corneal abrasion but irritation noted to the corneal surface with no significant uptake of floor seen.  Grossly, his sclera appears injected and erythematous.  There is no proptosis.    ENT: TMs normal as are EACs; nares patent; normal posterior pharynx and oral mucosa    NECK: no posterior midline tenderness, no meningeal signs, trachea midline     RESPIRATORY: no tachypnea, breath sounds clear to auscultation    CVS: normal S1/S2, no  murmurs/rubs/gallops    ABDOMEN: soft, nontender, no masses or organomegaly, no rebound, positive bowel sounds    MUSCULOSKELETAL: no deformities    SKIN: warm and dry, no acute rashes or ulceration, no erythema; the patient is dark skinned.    NEURO: GCS 15, cranial nerves intact.  Motor and sensory- good tone; normal gait and coordination    LYMPH: no lymphadenopathy          Emergency Department Course     Interventions:  tetracain 0.5 % ophthalmic solution  Fluorescein 1 mg ophthalmic strip        Emergency Department Course:  Past medical records, nursing notes, and vitals reviewed.    0618 I performed an exam of the patient as documented above.       0620 I rechecked the patient and discussed the results of his workup thus far.     Findings and plan explained to the Patient. Patient discharged home with instructions regarding supportive care, medications, and reasons to return. The importance of close follow-up was reviewed.     I personally reviewed results with the Patient and answered all related questions prior to discharge.     Impression & Plan     Medical Decision Making:  The patient is a 44-year-old male who presents with 4 days of increasing eye irritation and now a whitish discharge.  It sounds as though the inciting factor was when his young daughter tried to remove his glasses she poked him in his eye with her sharp fingernails.  He had some initial irritation with that and it is only gotten worse.    Today there appears no acute corneal abrasion.  His corneal surface does appear slightly punctate although he does not wear contact lenses.  There is no evidence of ulcer or foreign body.  No evidence of acute corneal abrasion.  At this point were treating him for conjunctivitis but I am also going to use an oral antibiotic as he does have swelling of the upper eyelid suggestive for cellulitis.    He will go home on ofloxacin as directed every 3 hours over the next several days while awake.  We will  also start Keflex 500 mg p.o. 3 times daily 7 days, Voltaren ophthalmic for irritation and discomfort 1 drop OD every 6 hours.  He will follow-up with OB/GYN eye physician surgeons Monday for recheck.      Diagnosis:    ICD-10-CM    1. Acute conjunctivitis of right eye, unspecified acute conjunctivitis type  H10.31        Disposition:  Discharged to home.    Discharge Medications:  Discharge Medication List as of 8/21/2020  6:38 AM      START taking these medications    Details   cephALEXin (KEFLEX) 500 MG capsule Take 1 capsule (500 mg) by mouth 3 times daily for 7 days, Disp-28 capsule,R-0, Local Print      diclofenac (VOLTAREN) 0.1 % ophthalmic solution Apply 1 drop to eye 4 times daily, Disp-10 mL,R-0, Local Print      ofloxacin (OCUFLOX) 0.3 % ophthalmic solution Place 1-2 drops into the right eye every 2 hours (while awake), Disp-1 Bottle,R-0, Local Print             Scribe Disclosure:  JARETT, Joe Grullon, am serving as a scribe at 6:18 AM on 8/21/2020 to document services personally performed by Josue Ragland MD based on my observations and the provider's statements to me.        Josue Ragland MD  08/21/20 1416
